# Patient Record
Sex: FEMALE | Race: WHITE
[De-identification: names, ages, dates, MRNs, and addresses within clinical notes are randomized per-mention and may not be internally consistent; named-entity substitution may affect disease eponyms.]

---

## 2018-04-06 ENCOUNTER — HOSPITAL ENCOUNTER (OUTPATIENT)
Dept: HOSPITAL 92 - SCSCT | Age: 83
Discharge: HOME | End: 2018-04-06
Payer: MEDICARE

## 2018-04-06 DIAGNOSIS — H83.3X3: Primary | ICD-10-CM

## 2018-04-06 DIAGNOSIS — I67.82: ICD-10-CM

## 2018-04-06 PROCEDURE — 70450 CT HEAD/BRAIN W/O DYE: CPT

## 2018-04-06 NOTE — CT
HEAD CT NONCONTRAST:

 

INDICATIONS:

Auditory hallucination.

 

COMPARISON:

11/19/2017

 

FINDINGS:

There is mild to moderate chronic microvascular ischemic disease redemonstrated.  The ventricular sys
tem is stable.  Mild age-related parenchymal volume loss is seen.  There is no acute intracranial mas
s effect or midline shift.

 

IMPRESSION:

1.  No intracranial hemorrhage or mass effect.

 

2.  Mild to moderate chronic microvascular ischemic disease.

 

POS: [place]

## 2018-07-10 ENCOUNTER — HOSPITAL ENCOUNTER (INPATIENT)
Dept: HOSPITAL 92 - ERS | Age: 83
LOS: 3 days | Discharge: HOME | DRG: 872 | End: 2018-07-13
Attending: FAMILY MEDICINE | Admitting: FAMILY MEDICINE
Payer: MEDICARE

## 2018-07-10 VITALS — BODY MASS INDEX: 30.1 KG/M2

## 2018-07-10 DIAGNOSIS — Z82.61: ICD-10-CM

## 2018-07-10 DIAGNOSIS — N12: ICD-10-CM

## 2018-07-10 DIAGNOSIS — Z82.49: ICD-10-CM

## 2018-07-10 DIAGNOSIS — K59.00: ICD-10-CM

## 2018-07-10 DIAGNOSIS — A41.51: Primary | ICD-10-CM

## 2018-07-10 DIAGNOSIS — E78.5: ICD-10-CM

## 2018-07-10 DIAGNOSIS — Z85.3: ICD-10-CM

## 2018-07-10 DIAGNOSIS — N95.2: ICD-10-CM

## 2018-07-10 DIAGNOSIS — Z66: ICD-10-CM

## 2018-07-10 DIAGNOSIS — Z88.0: ICD-10-CM

## 2018-07-10 DIAGNOSIS — Z80.3: ICD-10-CM

## 2018-07-10 DIAGNOSIS — F41.8: ICD-10-CM

## 2018-07-10 DIAGNOSIS — F32.9: ICD-10-CM

## 2018-07-10 DIAGNOSIS — I10: ICD-10-CM

## 2018-07-10 DIAGNOSIS — K57.00: ICD-10-CM

## 2018-07-10 LAB
ALBUMIN SERPL BCG-MCNC: 4.4 G/DL (ref 3.4–4.8)
ALP SERPL-CCNC: 63 U/L (ref 40–150)
ALT SERPL W P-5'-P-CCNC: 16 U/L (ref 8–55)
ANION GAP SERPL CALC-SCNC: 15 MMOL/L (ref 10–20)
AST SERPL-CCNC: 20 U/L (ref 5–34)
BASOPHILS # BLD AUTO: 0 THOU/UL (ref 0–0.2)
BASOPHILS NFR BLD AUTO: 0.1 % (ref 0–1)
BILIRUB SERPL-MCNC: 1.6 MG/DL (ref 0.2–1.2)
BUN SERPL-MCNC: 21 MG/DL (ref 9.8–20.1)
CALCIUM SERPL-MCNC: 9.7 MG/DL (ref 7.8–10.44)
CHLORIDE SERPL-SCNC: 104 MMOL/L (ref 98–107)
CO2 SERPL-SCNC: 24 MMOL/L (ref 23–31)
CREAT CL PREDICTED SERPL C-G-VRATE: 0 ML/MIN (ref 70–130)
CRYSTAL-AUWI FLAG: 0.2 (ref 0–15)
EOSINOPHIL # BLD AUTO: 0.3 THOU/UL (ref 0–0.7)
EOSINOPHIL NFR BLD AUTO: 1.6 % (ref 0–10)
GLOBULIN SER CALC-MCNC: 3.2 G/DL (ref 2.4–3.5)
GLUCOSE SERPL-MCNC: 107 MG/DL (ref 83–110)
HEV IGM SER QL: 0.4 (ref 0–7.99)
HGB BLD-MCNC: 13.3 G/DL (ref 12–16)
HYALINE CASTS #/AREA URNS LPF: (no result) LPF
LYMPHOCYTES # BLD: 1.1 THOU/UL (ref 1.2–3.4)
LYMPHOCYTES NFR BLD AUTO: 7.4 % (ref 21–51)
MCH RBC QN AUTO: 30.7 PG (ref 27–31)
MCV RBC AUTO: 87.7 FL (ref 78–98)
MONOCYTES # BLD AUTO: 1 THOU/UL (ref 0.11–0.59)
MONOCYTES NFR BLD AUTO: 6.7 % (ref 0–10)
NEUTROPHILS # BLD AUTO: 13 THOU/UL (ref 1.4–6.5)
NEUTROPHILS NFR BLD AUTO: 84.2 % (ref 42–75)
PATHC CAST-AUWI FLAG: 0.58 (ref 0–2.49)
PLATELET # BLD AUTO: 237 THOU/UL (ref 130–400)
POTASSIUM SERPL-SCNC: 3.7 MMOL/L (ref 3.5–5.1)
RBC # BLD AUTO: 4.33 MILL/UL (ref 4.2–5.4)
RBC UR QL AUTO: (no result) HPF (ref 0–3)
SODIUM SERPL-SCNC: 139 MMOL/L (ref 136–145)
SP GR UR STRIP: 1.01 (ref 1–1.04)
SPERM-AUWI FLAG: 0 (ref 0–9.9)
WBC # BLD AUTO: 15.4 THOU/UL (ref 4.8–10.8)
YEAST-AUWI FLAG: 0 (ref 0–25)

## 2018-07-10 PROCEDURE — 96375 TX/PRO/DX INJ NEW DRUG ADDON: CPT

## 2018-07-10 PROCEDURE — 90471 IMMUNIZATION ADMIN: CPT

## 2018-07-10 PROCEDURE — 81003 URINALYSIS AUTO W/O SCOPE: CPT

## 2018-07-10 PROCEDURE — 80053 COMPREHEN METABOLIC PANEL: CPT

## 2018-07-10 PROCEDURE — 71045 X-RAY EXAM CHEST 1 VIEW: CPT

## 2018-07-10 PROCEDURE — 83605 ASSAY OF LACTIC ACID: CPT

## 2018-07-10 PROCEDURE — 81015 MICROSCOPIC EXAM OF URINE: CPT

## 2018-07-10 PROCEDURE — 36416 COLLJ CAPILLARY BLOOD SPEC: CPT

## 2018-07-10 PROCEDURE — 90670 PCV13 VACCINE IM: CPT

## 2018-07-10 PROCEDURE — 87086 URINE CULTURE/COLONY COUNT: CPT

## 2018-07-10 PROCEDURE — 96361 HYDRATE IV INFUSION ADD-ON: CPT

## 2018-07-10 PROCEDURE — A4216 STERILE WATER/SALINE, 10 ML: HCPCS

## 2018-07-10 PROCEDURE — 36415 COLL VENOUS BLD VENIPUNCTURE: CPT

## 2018-07-10 PROCEDURE — 96365 THER/PROPH/DIAG IV INF INIT: CPT

## 2018-07-10 PROCEDURE — G0009 ADMIN PNEUMOCOCCAL VACCINE: HCPCS

## 2018-07-10 PROCEDURE — 87040 BLOOD CULTURE FOR BACTERIA: CPT

## 2018-07-10 PROCEDURE — 85025 COMPLETE CBC W/AUTO DIFF WBC: CPT

## 2018-07-10 NOTE — RAD
AP VIEW OF THE CHEST

7/10/18

 

INDICATION:

Fever.

 

COMPARISON:  

Prior exam dated 12/30/17.

 

IMPRESSION:  

There is stable cardiomegaly. No air space consolidation, pleural effusion or pneumothorax is evident
. Surgical clips in the left axillary region are stable. Osseous structures are similar.

 

POS: [facility]

## 2018-07-11 LAB
ALBUMIN SERPL BCG-MCNC: 3.9 G/DL (ref 3.4–4.8)
ALP SERPL-CCNC: 55 U/L (ref 40–150)
ALT SERPL W P-5'-P-CCNC: 13 U/L (ref 8–55)
ANION GAP SERPL CALC-SCNC: 13 MMOL/L (ref 10–20)
AST SERPL-CCNC: 18 U/L (ref 5–34)
BASOPHILS # BLD AUTO: 0 THOU/UL (ref 0–0.2)
BASOPHILS NFR BLD AUTO: 0.1 % (ref 0–1)
BILIRUB SERPL-MCNC: 1 MG/DL (ref 0.2–1.2)
BUN SERPL-MCNC: 19 MG/DL (ref 9.8–20.1)
CALCIUM SERPL-MCNC: 8.9 MG/DL (ref 7.8–10.44)
CHLORIDE SERPL-SCNC: 112 MMOL/L (ref 98–107)
CO2 SERPL-SCNC: 21 MMOL/L (ref 23–31)
CREAT CL PREDICTED SERPL C-G-VRATE: 65 ML/MIN (ref 70–130)
EOSINOPHIL # BLD AUTO: 0.5 THOU/UL (ref 0–0.7)
EOSINOPHIL NFR BLD AUTO: 3.4 % (ref 0–10)
GLOBULIN SER CALC-MCNC: 2.7 G/DL (ref 2.4–3.5)
GLUCOSE SERPL-MCNC: 108 MG/DL (ref 83–110)
HGB BLD-MCNC: 11.9 G/DL (ref 12–16)
LYMPHOCYTES # BLD: 1.9 THOU/UL (ref 1.2–3.4)
LYMPHOCYTES NFR BLD AUTO: 13.5 % (ref 21–51)
MCH RBC QN AUTO: 30.6 PG (ref 27–31)
MCV RBC AUTO: 88.8 FL (ref 78–98)
MONOCYTES # BLD AUTO: 1.1 THOU/UL (ref 0.11–0.59)
MONOCYTES NFR BLD AUTO: 7.9 % (ref 0–10)
NEUTROPHILS # BLD AUTO: 10.4 THOU/UL (ref 1.4–6.5)
NEUTROPHILS NFR BLD AUTO: 75.1 % (ref 42–75)
PLATELET # BLD AUTO: 205 THOU/UL (ref 130–400)
POTASSIUM SERPL-SCNC: 4.3 MMOL/L (ref 3.5–5.1)
RBC # BLD AUTO: 3.9 MILL/UL (ref 4.2–5.4)
SODIUM SERPL-SCNC: 142 MMOL/L (ref 136–145)
WBC # BLD AUTO: 13.9 THOU/UL (ref 4.8–10.8)

## 2018-07-11 RX ADMIN — Medication SCH ML: at 20:21

## 2018-07-11 RX ADMIN — Medication SCH ML: at 08:53

## 2018-07-11 NOTE — CON
DATE OF CONSULTATION:  07/11/2018

 

PRIMARY CARE PHYSICIAN:  Dr. Adam Diaz.

 

REASON FOR CONSULT:  History of recurrent urinary tract infection.

 

HISTORY OF PRESENT ILLNESS:  Ms. Lange is a pleasant 88-year-old female well 
known to me for history of recurrent UTI.  Patient with history of PENICILLIN 
allergy, followed by her allergist, Dr. Melo.  She has tolerated previously 
subsequent Keflex without significant issues.  She has minimal urgency and urge 
incontinence history, uses 1 pad per day, second one at night.  She has no 
significant postvoid residual retention of concern.  She is known to have 
severe atrophic vaginitis, grade I rectocele.  She has a propensity for urine 
urinalysis to be contaminated due to her body habitus and severe atrophy.  She 
was last seen by me in June 2018 with no symptoms of UTI.  They desired to come 
off the Keflex low dose and informed to call me if breakthrough UTI.  As I was 
out of the office last week, patient did call our office for symptoms of UTI.  
She did not obtain labs as advised that she had lack of transportation as her 
daughter was out of town.  As she subsequently had a urinalysis culture done 
and was found to have a positive E. coli urinary tract infection and we did 
send out antibiotics.  However, she presented last night due to history of 
fever of 101 per daughter.  She has been provided multiple antibiotics from the 
emergency room and a repeat urinalysis is grossly unremarkable thus far.  Blood 
culture negative thus far.  She states that she feels significantly better with 
IV antibiotics provided.

 

PAST MEDICAL HISTORY:  Hypertension, hyperlipidemia, arthritis, recurrent UTI, 
DJD of the lumbar spine, history of breast cancer, GERD, chronic back pain, 
pelvic prolapse, history of MI.

 

PAST SURGICAL HISTORY:  Total abdominal hysterectomy, breast biopsy, lumpectomy
, cystoscopy, bladder wash in 12/2014 grossly unremarkable.

 

FAMILY HISTORY:  History of CVA.

 

ALLERGIES:  PENICILLIN unconfirmed.  BACTRIM causes hives.

 

REVIEW OF SYSTEMS:  Ten-point review of systems, she has good family assist.

 

CURRENT MEDICATIONS:  Include Tylenol, Rocephin, Lovenox, Toprol, Zofran, 
Protonix, Crestor, Zoloft.

 

PHYSICAL EXAMINATION:

VITAL SIGNS:  Stable.  She is afebrile at 97.9, pulse 79, respirations 16, O2 
sat 92, blood pressure 174/77.  I's and O's bedside commode.

HEENT:  Grossly unremarkable.

HEART:  Regular rate.

LUNGS:  Clear.

ABDOMEN:  Soft, nontender, nondistended.  No CVA tenderness or suprapubic 
tenderness is appreciated.

GENITOURINARY:  Demonstrates severe vaginal atrophy, no gross prolapse 
appreciated on today's exam.

EXTREMITIES:  No cyanosis, clubbing or edema.

 

PERTINENT LABORATORIES:  She is admitted with white count of 15, currently 13.9
, hemoglobin 11.9, platelets 205.  Lactic acid is normal on admission.  
Creatinine stable at 0.75.  Normal LFTs.  Repeat urinalysis from the ER 
demonstrates 0-3 epithelial, no bacteria seen.  Trace leukocytes, large 
leukocyte, 7-10 rbc's, greater than 50 wbc's.  Repeat culture is negative thus 
far.  Blood culture negative.

 

PERTINENT LABS AND IMAGING:  CT in 10/2014 demonstrates diverticulosis, 
bilateral renal cysts, largest in the right kidney 5.7 cm.  There was a focus 
of air in the bladder, thickened bladder wall consistent with cystitis.

 

IMPRESSION AND PLAN:  Ms. Lange is an 88-year-old female well known to me with 
history of breast cancer, hypertension with severe vaginal atrophy with 
recurrent urinary tract infection.  She has been worked up for anatomical 
pathology not of concern.  She has been thoroughly informed regarding her risk 
factors due to severe vaginal atrophy.  She was previously on low dose Keflex.  
I will reconsider initiating a low dose again as they desire to be off 
antibiotics.  Currently, she is responding to antibiotics appropriately.  
Recommend, the patient can be discharged home if she continues to do well 
clinically.  As her repeat urine culture is negative due to multiple 
antibiotics provided from the ER, I would recommend discharging the patient 
with outpatient Omnicef.  As she previously was then cephalosporins without 
significant issues, I do believe it is safe enough for her to be discharged 
with Omnicef 300 mg one p.o. b.i.d. for 7-10 days.  She may follow up with me 
as previous.  She may be discharged, when medically stable per primary service.

 

KATHRYN

## 2018-07-11 NOTE — PDOC.FPRHP
- History of Present Illness


Chief Complaint: Fever/chills


History of Present Illness: 





87 yo F with PMH of HTN and freq UTI presents with frequent urination, 102.6 F, 

WC of 15.4, R 22. Three days ago she was started on bactrim by Dr. Bowen (

urologist). She complains of loss of vira, sore throat starting today. Denies 

dysuria, nausea/vomiting, chest pain, SOB, cough, or congestion. 





- Allergies/Adverse Reactions


 Allergies











Allergy/AdvReac Type Severity Reaction Status Date / Time


 


Penicillins Allergy   Verified 07/10/18 23:33


 


Sulfa (Sulfonamide Allergy   Verified 03/28/15 19:36





Antibiotics)     














- History


PMHx: HTN, Anxiety and depression, breast cancer


 


PSHx: Hysterectomy, partial mastectomy. 





FHx: Mother with heart disease (unspecified), sister with valve replacement, 

denies fam hx of DM, denies fam hx cancer. Brother that  with parkinsons.


 


Social: Denies tobacco use, alcohol or drug use. Retired. . 2 children. 

Daughter is POA.


 








- Review of Systems


General: reports: fever/chills, weight/appetite/sleep changes (5 lb wt gain, 

thought to be due to diet)


ENT: reports: other (hearing loss, R>L).  denies: nasal congestion


Respiratory: denies: cough, congestion, shortness of breath, exercise 

intolerance


Cardiovascular: denies: chest pain, palpitation


Gastrointestinal: reports: diarrhea, constipation (diarrhea and constipation on 

and off).  denies: nausea, vomiting, abdominal pain


Genitourinary: reports: incontinence, polyuria.  denies: dysuria, discharge


Skin: denies: rashes


Musculoskeletal: denies: pain, tenderness, stiffness, arthritis/arthralgias


Neurological: denies: numbness, seizure


Psychological: reports: anxiety, depression (hx of anxiety and depression, well 

controlled on zoloft)





- Vital signs


BP: [127/65]  HR: [70] RR: [18] Tmax: [102.6] Pox: [95]% on [RA]  Wt: [79.6]   








- Physical Exam


Constitutional: NAD, awake, alert and oriented


HEENT: normocephalic and atraumatic, PERRLA, conjunctiva clear, no scleral 

icterus


Neck: supple, other (LAD in cervical lymph nodes)


Heart: RRR, normal S1/S2, no murmurs/rubs/gallops, pulses present, no edema


Lungs: CTAB, good air movement, no rales/rhonchi, no wheezing, no retractions


Abdomen: soft, non-tender, bowel sounds present


Musculoskeletal: normal structure, normal tone


Skin: no rash/lesions


Psychiatric: normal mood and affect, other (somewhat poor historian)





FMR H&P: Results





- Labs


Result Diagrams: 


 18 04:58





 18 04:58


Lab results: 


 











WBC  15.4 thou/uL (4.8-10.8)  H  07/10/18  20:25    


 


Hgb  13.3 g/dL (12.0-16.0)   07/10/18  20:25    


 


Hct  38.0 % (36.0-47.0)   07/10/18  20:25    


 


MCV  87.7 fL (78.0-98.0)   07/10/18  20:25    


 


Plt Count  237 thou/uL (130-400)   07/10/18  20:25    


 


Neutrophils %  84.2 % (42.0-75.0)  H  07/10/18  20:25    


 


Sodium  139 mmol/L (136-145)   07/10/18  20:41    


 


Potassium  3.7 mmol/L (3.5-5.1)   07/10/18  20:41    


 


Chloride  104 mmol/L ()   07/10/18  20:41    


 


Carbon Dioxide  24 mmol/L (23-31)   07/10/18  20:41    


 


BUN  21 mg/dL (9.8-20.1)  H  07/10/18  20:41    


 


Creatinine  0.75 mg/dL (0.6-1.1)   07/10/18  20:41    


 


Glucose  107 mg/dL ()   07/10/18  20:41    


 


Lactic Acid  1.2 mmol/L (0.5-2.2)   07/10/18  20:41    


 


Calcium  9.7 mg/dL (7.8-10.44)   07/10/18  20:41    


 


Total Bilirubin  1.6 mg/dL (0.2-1.2)  H  07/10/18  20:41    


 


AST  20 U/L (5-34)   07/10/18  20:41    


 


ALT  16 U/L (8-55)   07/10/18  20:41    


 


Alkaline Phosphatase  63 U/L ()   07/10/18  20:41    


 


Serum Total Protein  7.6 g/dL (6.0-8.3)   07/10/18  20:41    


 


Albumin  4.4 g/dL (3.4-4.8)   07/10/18  20:41    


 


Urine Ketones  Negative mg/dL (Negative)   07/10/18  21:30    


 


Urine Blood  Trace  (Negative)  H  07/10/18  21:30    


 


Urine Nitrite  Negative  (Negative)   07/10/18  21:30    


 


Ur Leukocyte Esterase  Large  (Negative)  H  07/10/18  21:30    


 


Urine RBC  7-10 HPF (0-3)  H  07/10/18  21:30    


 


Urine WBC  Greater Than 50-TNTC HPF (0-3)  H  07/10/18  21:30    


 


Ur Squamous Epith Cells  0-3 HPF (0-3)   07/10/18  21:30    


 


Urine Bacteria  None Seen HPF (None Seen)   07/10/18  21:30    














- Radiology Interpretation


  ** Chest x-ray


Status: report reviewed by me (Stable cardiomegaly.)





FMR H&P: A/P





- Plan





Sepsis 2/2 to UTI


-Fever, WC 15.4, R 22 


-LA 1.2


-Rocephin started


-Fluid resuscitation, NS @125 ml/hr





Recurrent (likely E coli) UTI, Failed outpt treatment


-Failed outpt therapy on bactrim


-Rocephin and gentamicin started in ED, gentamicin discontinued


-Pending urine culture





HTN


-per pt, on metoprolol. Waiting on dosage to start.





HLD





MES 





Constipation


-Miralax PRN for constipation





Leukocytosis


-WC 15.4, due to UTI and sepsis. will trend





Anxiety/Depression


-well controlled on Zoloft





Code status: DNR


DVT Ppx: lovenox








FMR H&P: Upper Level





- Pertinent history





88 yr old female with HX of recurrent UTIs, HTN, HLD here for fever and chills 

that started the morning on 7/10/18. She states fever or hematuria is usually 

her only indication that she is having a UTI. She was feeling sorta "bad" last 

week with urinary frequency and thought she may be having a UTI. She left urine 

sample on 18 which is growing E. coli. She picked up an antibiotic (unsure 

type) yesterday and had 2 doses before Dr. Umanzor told her to go to ER due 

to worsened fever.  This is per patient history. She reports having a workup 

with Dr. Bob in the outpatient setting for her recurrent UTIs. 


She denies back pain, dysuria, hematuria, chest pain, SOB, cough, skin changes. 





- Pertinent findings





Gen: NAD, interacting appropriately, alert and oriented to person, place, and 

time, situation 


Throat: MMM, no erythema of post pharynx 


Cardiac: RRR, no M/R/G


Lungs: CTAB, no wheezes, rhales, rhonchi 


Neuro: CN 2-12 intact,no neural deficits noted, strength 5/5 in BUE/BLE


MsK: no CVA tenderness 








- Plan


Date/Time: 18 0107








I, [Gianna Pederson], have evaluated this patient and agree with findings/plan as 

outlined by intern resident. Pertinent changes/additions are listed here.


 


1. Sepsis 2/2 recurrent UTI, failed outpatient treatment 


-patient admitted to medical and clinically doing okay, would have to be 

transferred to [facility] as no tele beds available. Will cont on medical since she 

has not been tachycardic. 


-cont on rocephin


-patient improved clinically 


-cont IV fluids 


-daily CBC








2. HTN


-cont home meds once obtained





3. HLD


-cont home meds 











Attending Addendum





- Attending Addendum


Date/Time: 18 1238





I personally evaluated the patient and discussed the management with Dr. ELICEO Taveras and team.


I agree with and repeated the History, Examination, Assessment and Plan 

documented above with any addition or exceptions noted below.





Sepsis 2/2 pyelonephritis.  Has responded well to EGDT.  Continue to monitor.

## 2018-07-11 NOTE — PDOC.FPRHP
- Allergies/Adverse Reactions


 Allergies











Allergy/AdvReac Type Severity Reaction Status Date / Time


 


Penicillins Allergy   Verified 07/10/18 23:33


 


Sulfa (Sulfonamide Allergy   Verified 03/28/15 19:36





Antibiotics)     














- History


PMHx:


 


PSHx: 





FHx:


 


Social:


 








- Vital signs


BP: []  HR: [] RR: [] Tmax: [] Pox: []% on []  Wt: []   








FMR H&P: Results





- Labs


Result Diagrams: 


 07/10/18 20:25





 07/10/18 20:41


Lab results: 


 











WBC  15.4 thou/uL (4.8-10.8)  H  07/10/18  20:25    


 


Hgb  13.3 g/dL (12.0-16.0)   07/10/18  20:25    


 


Hct  38.0 % (36.0-47.0)   07/10/18  20:25    


 


MCV  87.7 fL (78.0-98.0)   07/10/18  20:25    


 


Plt Count  237 thou/uL (130-400)   07/10/18  20:25    


 


Neutrophils %  84.2 % (42.0-75.0)  H  07/10/18  20:25    


 


Sodium  139 mmol/L (136-145)   07/10/18  20:41    


 


Potassium  3.7 mmol/L (3.5-5.1)   07/10/18  20:41    


 


Chloride  104 mmol/L ()   07/10/18  20:41    


 


Carbon Dioxide  24 mmol/L (23-31)   07/10/18  20:41    


 


BUN  21 mg/dL (9.8-20.1)  H  07/10/18  20:41    


 


Creatinine  0.75 mg/dL (0.6-1.1)   07/10/18  20:41    


 


Glucose  107 mg/dL ()   07/10/18  20:41    


 


Lactic Acid  1.2 mmol/L (0.5-2.2)   07/10/18  20:41    


 


Calcium  9.7 mg/dL (7.8-10.44)   07/10/18  20:41    


 


Total Bilirubin  1.6 mg/dL (0.2-1.2)  H  07/10/18  20:41    


 


AST  20 U/L (5-34)   07/10/18  20:41    


 


ALT  16 U/L (8-55)   07/10/18  20:41    


 


Alkaline Phosphatase  63 U/L ()   07/10/18  20:41    


 


Serum Total Protein  7.6 g/dL (6.0-8.3)   07/10/18  20:41    


 


Albumin  4.4 g/dL (3.4-4.8)   07/10/18  20:41    


 


Urine Ketones  Negative mg/dL (Negative)   07/10/18  21:30    


 


Urine Blood  Trace  (Negative)  H  07/10/18  21:30    


 


Urine Nitrite  Negative  (Negative)   07/10/18  21:30    


 


Ur Leukocyte Esterase  Large  (Negative)  H  07/10/18  21:30    


 


Urine RBC  7-10 HPF (0-3)  H  07/10/18  21:30    


 


Urine WBC  Greater Than 50-TNTC HPF (0-3)  H  07/10/18  21:30    


 


Ur Squamous Epith Cells  0-3 HPF (0-3)   07/10/18  21:30    


 


Urine Bacteria  None Seen HPF (None Seen)   07/10/18  21:30    














FMR H&P: Upper Level





- Plan


Date/Time: 07/11/18 0021








I, [], have evaluated this patient and agree with findings/plan as outlined by 

intern resident. Pertinent changes/additions are listed here.

## 2018-07-11 NOTE — PDOC.EVN
Event Note





- Event Note


Event Note: 





Spoke with patient's daughter, Tracey, by patient request. She states the 

patient wants "everything done." 


We discussed this to include CPR, medications, and intubation for which she 

agreed to it all. She stated if she needs to be on life support long term, her 

mother would not want that and family would decide if it came to that.

## 2018-07-12 LAB
ALBUMIN SERPL BCG-MCNC: 3.5 G/DL (ref 3.4–4.8)
ALP SERPL-CCNC: 49 U/L (ref 40–150)
ALT SERPL W P-5'-P-CCNC: 13 U/L (ref 8–55)
ANION GAP SERPL CALC-SCNC: 15 MMOL/L (ref 10–20)
AST SERPL-CCNC: 16 U/L (ref 5–34)
BASOPHILS # BLD AUTO: 0 THOU/UL (ref 0–0.2)
BASOPHILS NFR BLD AUTO: 0.2 % (ref 0–1)
BILIRUB SERPL-MCNC: 0.6 MG/DL (ref 0.2–1.2)
BUN SERPL-MCNC: 14 MG/DL (ref 9.8–20.1)
CALCIUM SERPL-MCNC: 8.3 MG/DL (ref 7.8–10.44)
CHLORIDE SERPL-SCNC: 114 MMOL/L (ref 98–107)
CO2 SERPL-SCNC: 19 MMOL/L (ref 23–31)
CREAT CL PREDICTED SERPL C-G-VRATE: 74 ML/MIN (ref 70–130)
EOSINOPHIL # BLD AUTO: 0.4 THOU/UL (ref 0–0.7)
EOSINOPHIL NFR BLD AUTO: 5.9 % (ref 0–10)
GLOBULIN SER CALC-MCNC: 2.5 G/DL (ref 2.4–3.5)
GLUCOSE SERPL-MCNC: 104 MG/DL (ref 83–110)
HGB BLD-MCNC: 10.7 G/DL (ref 12–16)
LYMPHOCYTES # BLD: 1.5 THOU/UL (ref 1.2–3.4)
LYMPHOCYTES NFR BLD AUTO: 21.3 % (ref 21–51)
MCH RBC QN AUTO: 30.7 PG (ref 27–31)
MCV RBC AUTO: 89.2 FL (ref 78–98)
MONOCYTES # BLD AUTO: 1 THOU/UL (ref 0.11–0.59)
MONOCYTES NFR BLD AUTO: 14.5 % (ref 0–10)
NEUTROPHILS # BLD AUTO: 4.1 THOU/UL (ref 1.4–6.5)
NEUTROPHILS NFR BLD AUTO: 58.2 % (ref 42–75)
PLATELET # BLD AUTO: 190 THOU/UL (ref 130–400)
POTASSIUM SERPL-SCNC: 3.6 MMOL/L (ref 3.5–5.1)
RBC # BLD AUTO: 3.5 MILL/UL (ref 4.2–5.4)
SODIUM SERPL-SCNC: 144 MMOL/L (ref 136–145)
WBC # BLD AUTO: 7.1 THOU/UL (ref 4.8–10.8)

## 2018-07-12 RX ADMIN — Medication SCH ML: at 10:41

## 2018-07-12 RX ADMIN — Medication SCH ML: at 21:01

## 2018-07-12 NOTE — PDOC.FM
- Subjective


Subjective: 


Patient reports chills yesterday that were really scary and bothersome to her, 

but denies any fevers. She is having urinary frequency, but thinks its improved 

from before abx. She denies dysuria, suprapubic pain, N/V. 





- Objective


MAR Reviewed: Yes


Vital Signs & Weight: 


 Vital Signs (12 hours)











  Temp Pulse Resp BP Pulse Ox


 


 07/12/18 05:07  97.7 F  73  16  138/74  97


 


 07/12/18 00:32  98 F  73  18  124/62  97








 Weight











Admit Weight                   79.634 kg


 


Weight                         79.634 kg














I&O: 


 











 07/11/18 07/12/18 07/13/18





 06:59 06:59 06:59


 


Intake Total 1105 1720 


 


Output Total  2000 


 


Balance 1105 -280 











Result Diagrams: 


 07/12/18 03:38





 07/12/18 03:39





<Gianna Sanchez - Last Filed: 07/12/18 08:43>





- Objective


Vital Signs & Weight: 


 Vital Signs (12 hours)











  Temp Pulse Resp BP BP Pulse Ox


 


 07/12/18 08:00  98.0 F  68  20   154/74 H  96


 


 07/12/18 05:07  97.7 F  73  16  138/74   97








 Weight











Admit Weight                   79.634 kg


 


Weight                         79.634 kg














I&O: 


 











 07/11/18 07/12/18 07/13/18





 06:59 06:59 06:59


 


Intake Total 1105 1720 


 


Output Total  2000 


 


Balance 1105 -280 











Result Diagrams: 


 07/12/18 03:38





 07/12/18 03:39





<Scar Steele - Last Filed: 07/12/18 12:53>





Phys Exam





- Physical Examination


Constitutional: NAD


HEENT: moist MMs


Respiratory: no wheezing, no rales, no rhonchi, clear to auscultation bilateral


Cardiovascular: RRR, no significant murmur, no rub


Gastrointestinal: soft, non-tender, no distention, positive bowel sounds


Musculoskeletal: no edema, pulses present


Neurological: non-focal, moves all 4 limbs


Psychiatric: normal affect, A&O x 3


Skin: normal turgor, cap refill <2 seconds





<Gianna Sanchez - Last Filed: 07/12/18 08:43>





Dx/Plan


(1) Sepsis


Code(s): A41.9 - SEPSIS, UNSPECIFIED ORGANISM   Status: Acute   





(2) Leukocytosis


Code(s): D72.829 - ELEVATED WHITE BLOOD CELL COUNT, UNSPECIFIED   Status: Acute

   





(3) Cystitis


Code(s): N30.90 - CYSTITIS, UNSPECIFIED WITHOUT HEMATURIA   Status: Acute   





- Plan


Plan: 


Sepsis, resolved


2/2 UTI


Fever, WC 15.4, R 22, sepsis has resolved at this time with improved WBC count, 

afebrile, and VSS.


-Rocephin


-d/c'd fluids today





Recurrent UTI, Failed outpt treatment


Failed outpt therapy on bactrim. 


-Rocephin and gentamicin started in ED, gentamicin discontinued


-UCx NG @ 12 hrs


-Plan to switch to Omnicef 300mg BID for 7-10 days per Urology recs





HTN


-cont home metoprolol





HLD


-Cont home statin





Constipation


-Miralax PRN for constipation





Leukocytosis, resolved


-WC 15.4, due to UTI. Has resolved. 





Anxiety/Depression


-well controlled on Zoloft, will continue. 





<Gianna Sanchez - Last Filed: 07/12/18 08:43>





Attending Addendum





- Attending Addendum


Date/Time: 07/12/18 1252





I personally evaluated the patient and discussed the management with Dr. Sanchez.


I agree with and repeated the History, Examination, Assessment and Plan 

documented above with any addition or exceptions noted below.





Pt doing very well.  Anticipate d/c with appropriate antibiotics and follow up.








<Scar Steele - Last Filed: 07/12/18 12:53>

## 2018-07-12 NOTE — PRG
DATE OF SERVICE:  07/12/2018

 

SUBJECTIVE:  The patient is feeling well.  Denies dysuria, suprapubic pain, CVA 
tenderness.

 

PHYSICAL EXAMINATION:

VITAL SIGNS:  Stable.  She is afebrile, blood pressure is stable.  I's and O's 
1720 in, 2000 out.  She is negative 280 mL.

ABDOMEN:  Soft, nontender, nondistended.

 

LABORATORY DATA:  White count normalized to 7.1, hemoglobin stable, renal 
function stable with creatinine of 0.66.  Repeat urine culture negative.  Blood 
culture negative thus far.

 

Prior urine culture on 07/06/2018 demonstrates E. coli sensitive to 
cephalosporins, resistant to quinolones and Bactrim.

 

IMPRESSION AND PLAN:  Ms. Raul Lange is an 88-year-old female with history 
of recurrent UTI,  workup negative for occult pathology.  Her main risk 
factors are severe atrophic vaginitis, she was previously on low dose Keflex.  
They desired observation off of antibiotic therapy.  For this acute cystitis, 
recommend Omnicef for 7-10 days for acute cystitis.  She agrees to be back on 
Keflex low dose.  Please provide her with 7 days of Omnicef on  discharge, 
subsequently when she is done with her Omnicef, she should be placed on Keflex 
250 mg 1 p.o. daily.  She can follow up with me as planned.  Call if any 
questions or concerns.  From a urologic perspective, she may be discharged with 
Omnicef, Keflex low dose prophylaxis 250 mg 1 p.o. daily.

 

MTDD

## 2018-07-13 VITALS — SYSTOLIC BLOOD PRESSURE: 153 MMHG | DIASTOLIC BLOOD PRESSURE: 69 MMHG

## 2018-07-13 VITALS — TEMPERATURE: 97.9 F

## 2018-07-13 LAB
ALBUMIN SERPL BCG-MCNC: 3.6 G/DL (ref 3.4–4.8)
ALP SERPL-CCNC: 48 U/L (ref 40–150)
ALT SERPL W P-5'-P-CCNC: 13 U/L (ref 8–55)
ANION GAP SERPL CALC-SCNC: 11 MMOL/L (ref 10–20)
AST SERPL-CCNC: 14 U/L (ref 5–34)
BASOPHILS # BLD AUTO: 0 THOU/UL (ref 0–0.2)
BASOPHILS NFR BLD AUTO: 0.2 % (ref 0–1)
BILIRUB SERPL-MCNC: 0.7 MG/DL (ref 0.2–1.2)
BUN SERPL-MCNC: 15 MG/DL (ref 9.8–20.1)
CALCIUM SERPL-MCNC: 9.2 MG/DL (ref 7.8–10.44)
CHLORIDE SERPL-SCNC: 110 MMOL/L (ref 98–107)
CO2 SERPL-SCNC: 27 MMOL/L (ref 23–31)
CREAT CL PREDICTED SERPL C-G-VRATE: 76 ML/MIN (ref 70–130)
EOSINOPHIL # BLD AUTO: 0.4 THOU/UL (ref 0–0.7)
EOSINOPHIL NFR BLD AUTO: 5.3 % (ref 0–10)
GLOBULIN SER CALC-MCNC: 2.7 G/DL (ref 2.4–3.5)
GLUCOSE SERPL-MCNC: 110 MG/DL (ref 83–110)
HGB BLD-MCNC: 10.9 G/DL (ref 12–16)
LYMPHOCYTES # BLD: 1.4 THOU/UL (ref 1.2–3.4)
LYMPHOCYTES NFR BLD AUTO: 19.6 % (ref 21–51)
MCH RBC QN AUTO: 30.7 PG (ref 27–31)
MCV RBC AUTO: 88.5 FL (ref 78–98)
MONOCYTES # BLD AUTO: 0.8 THOU/UL (ref 0.11–0.59)
MONOCYTES NFR BLD AUTO: 11.6 % (ref 0–10)
NEUTROPHILS # BLD AUTO: 4.5 THOU/UL (ref 1.4–6.5)
NEUTROPHILS NFR BLD AUTO: 63.3 % (ref 42–75)
PLATELET # BLD AUTO: 207 THOU/UL (ref 130–400)
POTASSIUM SERPL-SCNC: 3.7 MMOL/L (ref 3.5–5.1)
RBC # BLD AUTO: 3.54 MILL/UL (ref 4.2–5.4)
SODIUM SERPL-SCNC: 144 MMOL/L (ref 136–145)
WBC # BLD AUTO: 7.2 THOU/UL (ref 4.8–10.8)

## 2018-07-13 RX ADMIN — Medication SCH ML: at 09:22

## 2018-07-13 NOTE — PDOC.FM
- Subjective


Subjective: 


Patient doing well, denies any fevers, chills, abdominal pain, N/V. She reports 

some urinary urgency and frequency, but denies dysuria. 





- Objective


MAR Reviewed: Yes


Vital Signs & Weight: 


 Vital Signs (12 hours)











  Temp Pulse Resp BP Pulse Ox


 


 07/13/18 04:00  97.9 F  59 L  18  178/79 H  95


 


 07/13/18 02:05      96


 


 07/13/18 01:18  98.9 F  71  18  176/72 H  95








 Weight











Admit Weight                   79.634 kg


 


Weight                         79.634 kg














I&O: 


 











 07/12/18 07/13/18 07/14/18





 06:59 06:59 06:59


 


Intake Total 1720 800 


 


Output Total 2000  


 


Balance -280 800 











Result Diagrams: 


 07/13/18 04:49





 07/13/18 04:49





<Gianna Sanchez - Last Filed: 07/13/18 08:44>





- Objective


Vital Signs & Weight: 


 Vital Signs (12 hours)











  Temp Pulse Resp BP BP Pulse Ox


 


 07/13/18 13:30     153/69 H  


 


 07/13/18 12:55   76    


 


 07/13/18 08:56  97.9 F  76  18   184/79 H  97


 


 07/13/18 04:00  97.9 F  59 L  18   178/79 H  95








 Weight











Admit Weight                   79.634 kg


 


Weight                         79.634 kg














I&O: 


 











 07/12/18 07/13/18 07/14/18





 06:59 06:59 06:59


 


Intake Total 1720 800 


 


Output Total 2000  


 


Balance -280 800 











Result Diagrams: 


 07/13/18 04:49





 07/13/18 04:49





<Juma Caceres - Last Filed: 07/13/18 14:27>





Phys Exam





- Physical Examination


Constitutional: NAD


HEENT: moist MMs, sclera anicteric


Respiratory: no wheezing, no rales, no rhonchi, clear to auscultation bilateral


Cardiovascular: RRR, no rub


3/6 systolic murmur


Gastrointestinal: soft, non-tender, no distention, positive bowel sounds


Musculoskeletal: no edema, pulses present


Neurological: non-focal, moves all 4 limbs


Psychiatric: normal affect, A&O x 3


Skin: normal turgor, cap refill <2 seconds





<Gianna Sanchez - Last Filed: 07/13/18 08:44>





Dx/Plan


(1) Sepsis


Code(s): A41.9 - SEPSIS, UNSPECIFIED ORGANISM   Status: Acute   


  QualifierTitle:    Sepsis type: Escherichia coli   Qualified Code(s): A41.51 

- Sepsis due to Escherichia coli [E. coli]   





(2) Leukocytosis


Code(s): D72.829 - ELEVATED WHITE BLOOD CELL COUNT, UNSPECIFIED   Status: Acute

   


  QualifierTitle:    Leukocytosis type: unspecified   Qualified Code(s): 

D72.829 - Elevated white blood cell count, unspecified   





(3) Pyelonephritis


Code(s): N12 - TUBULO-INTERSTITIAL NEPHRITIS, NOT SPCF AS ACUTE OR CHRONIC   

Status: Acute   





(4) HTN (hypertension)


Code(s): I10 - ESSENTIAL (PRIMARY) HYPERTENSION   Status: Acute   


  QualifierTitle:    Hypertension type: essential hypertension   Qualified Code(

s): I10 - Essential (primary) hypertension   





(5) MDD (major depressive disorder)


Code(s): F32.9 - MAJOR DEPRESSIVE DISORDER, SINGLE EPISODE, UNSPECIFIED   Status

: Acute   





(6) HLD (hyperlipidemia)


Code(s): E78.5 - HYPERLIPIDEMIA, UNSPECIFIED   Status: Acute   


  QualifierTitle:    Hyperlipidemia type: unspecified   Qualified Code(s): 

E78.5 - Hyperlipidemia, unspecified   





- Plan


Plan: 


Sepsis, resolved


2/2 pyelonephritis


Fever, WC 15.4, R 22, sepsis has resolved at this time with improved WBC count, 

afebrile, and VSS.


Rocephin initially, but has been switched to cefdinir





Pyelonephritis


Failed outpt therapy on bactrim. Has h/o recurrent UTI's. 


UCx from ER visit a few days prior to admission showed E. coli resistant to 

cipro and bactrim


-Rocephin and gentamicin started in ED, was treated with rocephin for 2 days, 

but has been switched to Cefdinir on 7/12 with plans for 10 days of outpatient 

therapy with this


-UCx NG @ 12 hrs


-f/u with urology outpatient





HTN


-cont home metoprolol





HLD


-Cont home statin





Constipation


-Miralax PRN for constipation





Leukocytosis, resolved


-WC 15.4 intially 2/2 to pyelonephritis. Has resolved. 





Anxiety/Depression


-well controlled on Zoloft, will continue. 





Dispo: plan for d/c home today on cefdinir





<Gianna Sanchez - Last Filed: 07/13/18 08:44>





Attending Addendum





- Attending Addendum


Date/Time: 07/13/18 5813





I personally evaluated the patient and discussed the management with Dr. Sanchez


I agree with the History, Examination, Assessment and Plan documented above 

with any addition or exceptions noted below.Patient is stable for dismissal.








<Juma Caceres - Last Filed: 07/13/18 14:27>

## 2018-07-15 NOTE — DIS-2
DATE OF ADMISSION:  07/11/2018

 

DATE OF DISCHARGE:  07/13/2018

 

ADMITTING ATTENDING:  Cassie Brand M.D.

 

DISCHARGE ATTENDING:  Juma Caceres MD

 

ADMITTING RESIDENT:  Genevieve Taveras MD

 

DISCHARGE RESIDENT:  Gianna Sanchez MD

 

CONSULTATIONS:  Dr. Umanzor with Urology.

 

PROCEDURES:  None.

 

PRIMARY DIAGNOSES:

1.  Sepsis.

2.  Pyelonephritis.

3.  Leukocytosis.

 

SECONDARY DIAGNOSES:

1.  Hypertension.

2.  Hyperlipidemia.

3.  Atrophic vaginitis.

4.  Constipation.

5.  Anxiety and depression.

 

DISCHARGE MEDICATIONS:

1.  Cefdinir 300 mg p.o. q. 12 hours for 9 days.

2.  Cephalexin 250 mg p.o. daily to be started after completion of the cefdinir.

3.  Aspirin 81 mg p.o. daily.

4.  Nexium 40 mg p.o. q.a.m.

5.  Metoprolol succinate 25 mg extended release 20 p.o. b.i.d.

6.  MiraLax 17 grams p.o. daily p.r.n. constipation.

7.  Rosuvastatin 10 mg p.o. daily.

8.  Sertraline 50 mg p.o. daily.

 

DISCONTINUED MEDICATIONS:  None.

 

HISTORY OF PRESENT ILLNESS AND HOSPITAL COURSE:  This is an 88-year-old female with past medical hist
ory of atrophic vaginitis and recurrent UTIs, presents to the ER with sepsis secondary to urinary tra
ct infection.  The patient was found to have an initial white blood cell count of 15.4.  The patient 
had been seen in the clinic on 07/06/2018 and the patient had been given Bactrim empirically prior to
 the results of that by her PCP.  However, the patient's symptoms had continued to get worse, and so 
she presented to the ER.  The patient was previously on a low dose prophylactic antibiotic for her re
current urinary tract infections; however, has not been on one recently.  The patient was started on 
Rocephin and showed significant clinical improvement.  The patient sees Dr. Umanzor from Urology,
 who discussed starting her back on a prophylactic medication after she recovers from this urinary tr
act infection.  The patient's white blood cell count trended down to 13.9 and then 7.1 after fluids a
nd antibiotics.  The patient's urine culture from this hospitalization grew 25,000-50,000 mixed skin 
and enteric mary.

 

Of note, the patient also notes some elevated blood pressures during her hospitalization.  She is onl
y on metoprolol for blood pressure control at home.  The patient was given a dose of hydralazine to h
elp improve her blood pressure, which improved it to 150/69.  The patient was recommended to discuss 
her blood pressure control with her primary care physician, as the patient reported that her blood pr
essure is not usually this high at home.  I did not want to drop her blood pressure too low and put h
er at risk for falls at this time.

 

DISPOSITION:  Stable.

 

DISCHARGE INSTRUCTIONS:

1.  Location:  Home.

2.  Diet:  Heart healthy.

3.  Activity:  As tolerated.

4.  Follow up with Dr. Diaz in 1 week and with Dr. Umanzor.

## 2018-10-25 ENCOUNTER — HOSPITAL ENCOUNTER (OUTPATIENT)
Dept: HOSPITAL 92 - SCSER | Age: 83
Setting detail: OBSERVATION
LOS: 1 days | Discharge: HOME | End: 2018-10-26
Attending: FAMILY MEDICINE | Admitting: FAMILY MEDICINE
Payer: MEDICARE

## 2018-10-25 VITALS — BODY MASS INDEX: 28.5 KG/M2

## 2018-10-25 DIAGNOSIS — Z88.0: ICD-10-CM

## 2018-10-25 DIAGNOSIS — Z88.2: ICD-10-CM

## 2018-10-25 DIAGNOSIS — E78.5: ICD-10-CM

## 2018-10-25 DIAGNOSIS — N30.00: Primary | ICD-10-CM

## 2018-10-25 DIAGNOSIS — F41.9: ICD-10-CM

## 2018-10-25 DIAGNOSIS — Z79.82: ICD-10-CM

## 2018-10-25 DIAGNOSIS — Z79.899: ICD-10-CM

## 2018-10-25 DIAGNOSIS — I10: ICD-10-CM

## 2018-10-25 DIAGNOSIS — F32.9: ICD-10-CM

## 2018-10-25 DIAGNOSIS — E86.0: ICD-10-CM

## 2018-10-25 LAB
ALBUMIN SERPL BCG-MCNC: 4.6 G/DL (ref 3.4–4.8)
ALP SERPL-CCNC: 57 U/L (ref 40–150)
ALT SERPL W P-5'-P-CCNC: 18 U/L (ref 8–55)
ANION GAP SERPL CALC-SCNC: 19 MMOL/L (ref 10–20)
AST SERPL-CCNC: 26 U/L (ref 5–34)
BACTERIA UR QL AUTO: (no result) HPF
BASOPHILS # BLD AUTO: 0.1 THOU/UL (ref 0–0.2)
BASOPHILS NFR BLD AUTO: 1.2 % (ref 0–1)
BILIRUB SERPL-MCNC: 0.9 MG/DL (ref 0.2–1.2)
BUN SERPL-MCNC: 27 MG/DL (ref 9.8–20.1)
CALCIUM SERPL-MCNC: 10.1 MG/DL (ref 7.8–10.44)
CHLORIDE SERPL-SCNC: 107 MMOL/L (ref 98–107)
CK MB SERPL-MCNC: 2.5 NG/ML (ref 0–6.6)
CO2 SERPL-SCNC: 19 MMOL/L (ref 23–31)
CREAT CL PREDICTED SERPL C-G-VRATE: 0 ML/MIN (ref 70–130)
EOSINOPHIL # BLD AUTO: 0.2 THOU/UL (ref 0–0.7)
EOSINOPHIL NFR BLD AUTO: 2.4 % (ref 0–10)
GLOBULIN SER CALC-MCNC: 3.8 G/DL (ref 2.4–3.5)
GLUCOSE SERPL-MCNC: 112 MG/DL (ref 83–110)
HGB BLD-MCNC: 14 G/DL (ref 12–16)
LIPASE SERPL-CCNC: 6 U/L (ref 8–78)
LYMPHOCYTES # BLD: 2.3 THOU/UL (ref 1.2–3.4)
LYMPHOCYTES NFR BLD AUTO: 25.5 % (ref 21–51)
MCH RBC QN AUTO: 28.1 PG (ref 27–31)
MCV RBC AUTO: 85.5 FL (ref 78–98)
MONOCYTES # BLD AUTO: 0.8 THOU/UL (ref 0.11–0.59)
MONOCYTES NFR BLD AUTO: 8.7 % (ref 0–10)
NEUTROPHILS # BLD AUTO: 5.7 THOU/UL (ref 1.4–6.5)
NEUTROPHILS NFR BLD AUTO: 62.3 % (ref 42–75)
PLATELET # BLD AUTO: 217 THOU/UL (ref 130–400)
POTASSIUM SERPL-SCNC: 4.3 MMOL/L (ref 3.5–5.1)
PROT UR STRIP.AUTO-MCNC: 100 MG/DL
RBC # BLD AUTO: 4.98 MILL/UL (ref 4.2–5.4)
SODIUM SERPL-SCNC: 141 MMOL/L (ref 136–145)
SP GR UR STRIP: 1.01 (ref 1–1.03)
TROPONIN I SERPL DL<=0.01 NG/ML-MCNC: (no result) NG/ML (ref ?–0.03)
WBC # BLD AUTO: 9.1 THOU/UL (ref 4.8–10.8)
WBC UR QL AUTO: (no result) HPF (ref 0–3)

## 2018-10-25 PROCEDURE — 84484 ASSAY OF TROPONIN QUANT: CPT

## 2018-10-25 PROCEDURE — 84443 ASSAY THYROID STIM HORMONE: CPT

## 2018-10-25 PROCEDURE — 87077 CULTURE AEROBIC IDENTIFY: CPT

## 2018-10-25 PROCEDURE — 96361 HYDRATE IV INFUSION ADD-ON: CPT

## 2018-10-25 PROCEDURE — 87086 URINE CULTURE/COLONY COUNT: CPT

## 2018-10-25 PROCEDURE — 93005 ELECTROCARDIOGRAM TRACING: CPT

## 2018-10-25 PROCEDURE — 81003 URINALYSIS AUTO W/O SCOPE: CPT

## 2018-10-25 PROCEDURE — 85025 COMPLETE CBC W/AUTO DIFF WBC: CPT

## 2018-10-25 PROCEDURE — G0378 HOSPITAL OBSERVATION PER HR: HCPCS

## 2018-10-25 PROCEDURE — 82553 CREATINE MB FRACTION: CPT

## 2018-10-25 PROCEDURE — 80048 BASIC METABOLIC PNL TOTAL CA: CPT

## 2018-10-25 PROCEDURE — 81015 MICROSCOPIC EXAM OF URINE: CPT

## 2018-10-25 PROCEDURE — 83880 ASSAY OF NATRIURETIC PEPTIDE: CPT

## 2018-10-25 PROCEDURE — 99285 EMERGENCY DEPT VISIT HI MDM: CPT

## 2018-10-25 PROCEDURE — 87186 SC STD MICRODIL/AGAR DIL: CPT

## 2018-10-25 PROCEDURE — 96365 THER/PROPH/DIAG IV INF INIT: CPT

## 2018-10-25 PROCEDURE — 80053 COMPREHEN METABOLIC PANEL: CPT

## 2018-10-25 PROCEDURE — 36415 COLL VENOUS BLD VENIPUNCTURE: CPT

## 2018-10-25 PROCEDURE — 71045 X-RAY EXAM CHEST 1 VIEW: CPT

## 2018-10-25 PROCEDURE — 82550 ASSAY OF CK (CPK): CPT

## 2018-10-25 PROCEDURE — 83690 ASSAY OF LIPASE: CPT

## 2018-10-25 NOTE — RAD
PORTABLE AP CHEST X-RAY

10/25/18

 

HISTORY: 

Chest pain. 

 

COMPARISON:  

7/10/18.

 

FINDINGS:  

The cardiac silhouette remains mildly enlarged. Pulmonary vasculature is within normal limits. There 
is linear scarring again present at the left lung base. The lungs are otherwise clear. Surgical clips
 again overlie the left axillary region. 

 

IMPRESSION:  

1.      No acute cardiopulmonary process. 

2.      Mild cardiomegaly. 

 

POS: [facility]

## 2018-10-25 NOTE — PDOC.FPRHP
- History of Present Illness


Chief Complaint: weakness


History of Present Illness: 





89 yo F with PMH of recurrent UTIs on ppx cefdinir is transfer from hospitals 

ED for dizziness. This afternoon she started feeling weak so went home and 

check her BP which was 90/60 (low for her) and pulse in 130s. She thought she 

may be getting another UTI so went to the [place] ED. She has been 

hospitalized multiple times for severe UTIs and is on prophylactic cefdinir. At 

the time of sxs she denied fevers, dysuria, abdominal or flank pain.





 


ED Course: 





NS bolus, rocephin x1





- Allergies/Adverse Reactions


 Allergies











Allergy/AdvReac Type Severity Reaction Status Date / Time


 


Penicillins Allergy   Verified 10/25/18 23:44


 


Sulfa (Sulfonamide Allergy   Verified 10/25/18 23:44





Antibiotics)     














- Home Medications


 











 Medication  Instructions  Recorded  Confirmed  Type


 


Aspirin [Aspirin Chewable Tablet] 81 mg PO DAILY 18 History


 


Esomeprazole Magnesium [NexIUM] 40 mg PO QAM-WM 18 History


 


Metoprolol Succinate [Toprol Xl] 25 mg PO BID 18 History


 


Rosuvastatin Calcium 10 mg PO DAILY 18 History


 


Sertraline HCl 50 mg PO DAILY 18 History


 


Cefdinir 300 mg PO Q12HR #18 capsule 18  Rx


 


Polyethylene Glycol 3350 [Miralax] 17 gm PO DAILYPRN PRN  pk 18  Rx


 


Cephalexin [Keflex] 250 mg PO DAILY #30 cap 07/15/18  Rx














- History


PMHx: Recurrent UTIs, prolapsed bladder, HTN, Anxiety and depression, breast 

cancer


 


PSHx: Hysterectomy, partial mastectomy. 





FHx: Mother with heart disease (unspecified), sister with valve replacement, 

denies fam hx of DM, denies fam hx cancer. Brother that  with parkinsons.


 


Social: Denies tobacco use, alcohol or drug use. Retired. . 2 children. 

Daughter is POA.


 








- Review of Systems


General: denies: fever/chills, weight/appetite/sleep changes


Eyes: denies: vision changes


ENT: denies: nasal congestion, rhinorrhea


Respiratory: denies: cough, congestion, shortness of breath


Cardiovascular: denies: chest pain, palpitation


Gastrointestinal: denies: nausea, vomiting, diarrhea


Genitourinary: denies: incontinence, dysuria, discharge


Skin: denies: lesions, jaundice


Musculoskeletal: denies: tenderness, stiffness


Neurological: reports: weakness.  denies: syncope, seizure


Psychological: denies: anxiety, depression





- Vital signs


BP: [183/81]  HR: [63] RR: [14] Tmax: [97.8] Pox: [94]% on [RA]  Wt: [75]   








- Physical Exam


Constitutional: NAD, awake, alert and oriented


HEENT: normocephalic and atraumatic, PERRLA, EOMI


Neck: supple, FROM


Chest: no-tender to palpation, no lesions


Heart: RRR, normal S1/S2


Lungs: CTAB, no respiratory distress


Abdomen: soft, non-tender, no masses/distention


Musculoskeletal: normal structure


Neurological: no focal deficit, CN II-XII intact


Skin: no rash/lesions, capillary refill <2 seconds


Heme/Lymphatic: no unusual bruising or bleeding


Psychiatric: normal mood and affect





FMR H&P: Results





- Labs


Result Diagrams: 


 10/26/18 04:21





 10/26/18 04:21


Lab results: 


 











WBC  9.1 thou/uL (4.8-10.8)   10/25/18  18:25    


 


Hgb  14.0 g/dL (12.0-16.0)   10/25/18  18:25    


 


Hct  42.6 % (36.0-47.0)   10/25/18  18:25    


 


MCV  85.5 fL (78.0-98.0)   10/25/18  18:25    


 


Plt Count  217 thou/uL (130-400)   10/25/18  18:25    


 


Neutrophils %  62.3 % (42.0-75.0)   10/25/18  18:25    


 


Sodium  141 mmol/L (136-145)   10/25/18  18:25    


 


Potassium  4.3 mmol/L (3.5-5.1)   10/25/18  18:25    


 


Chloride  107 mmol/L ()   10/25/18  18:25    


 


Carbon Dioxide  19 mmol/L (23-31)  L  10/25/18  18:25    


 


BUN  27 mg/dL (9.8-20.1)  H  10/25/18  18:25    


 


Creatinine  0.97 mg/dL (0.6-1.1)   10/25/18  18:25    


 


Glucose  112 mg/dL ()  H  10/25/18  18:25    


 


Calcium  10.1 mg/dL (7.8-10.44)   10/25/18  18:25    


 


Total Bilirubin  0.9 mg/dL (0.2-1.2)   10/25/18  18:25    


 


AST  26 U/L (5-34)   10/25/18  18:25    


 


ALT  18 U/L (8-55)   10/25/18  18:25    


 


Alkaline Phosphatase  57 U/L ()   10/25/18  18:25    


 


Creatine Kinase  91 U/L ()   10/25/18  18:25    


 


CK-MB (CK-2)  2.5 ng/mL (0-6.6)   10/25/18  18:25    


 


B-Natriuretic Peptide  95.9 pg/mL (0-100)   10/25/18  18:25    


 


Serum Total Protein  8.4 g/dL (6.0-8.3)  H  10/25/18  18:25    


 


Albumin  4.6 g/dL (3.4-4.8)   10/25/18  18:25    


 


Lipase  6 U/L (8-78)  L  10/25/18  18:25    


 


Urine Ketones  Negative mg/dL (Negative)   10/25/18  19:08    


 


Urine Blood  Large  (Negative)  H  10/25/18  19:08    


 


Urine Nitrite  Positive  (Negative)  H  10/25/18  19:08    


 


Ur Leukocyte Esterase  Large  (Negative)  H  10/25/18  19:08    


 


Urine RBC  11-20 HPF (0-3)  H  10/25/18  19:08    


 


Urine WBC  21-50 HPF (0-3)  H  10/25/18  19:08    


 


Ur Squamous Epith Cells  0-3 HPF (0-3)   10/25/18  19:08    


 


Urine Bacteria  2+ HPF (None Seen)  H  10/25/18  19:08    














FMR H&P: A/P





- Problem List


(1) UTI (urinary tract infection)


Current Visit: Yes   Status: Acute   





(2) HLD (hyperlipidemia)


Current Visit: No   Status: Acute   Code(s): E78.5 - HYPERLIPIDEMIA, 

UNSPECIFIED   


Qualifiers: 


   Hyperlipidemia type: unspecified   Qualified Code(s): E78.5 - Hyperlipidemia

, unspecified   





(3) HTN (hypertension)


Current Visit: No   Status: Acute   Code(s): I10 - ESSENTIAL (PRIMARY) 

HYPERTENSION   


Qualifiers: 


   Hypertension type: essential hypertension   Qualified Code(s): I10 - 

Essential (primary) hypertension   





(4) MDD (major depressive disorder)


Current Visit: No   Status: Acute   Code(s): F32.9 - MAJOR DEPRESSIVE DISORDER, 

SINGLE EPISODE, UNSPECIFIED   





- Plan


Disposition/LOS: 








89 yo F with hx of recurrent UTIs with UTI w/ failed outpt. tx





1. UTI with failed outpatient treatment


-no sepsis criteria met. stable. 


-recurrent UTIs likely 2/2 prolapsed bladder (per pt)


-patient had been on cefdinir outpt from urologist


-possibility there there is change in organism species/resistance/

sensitivities. will obtain new ucx to reassess and tailor abx as needed


-continue rocephin & macrobid, prior cultures show sensitivity to these meds


-continue mIVF


-consider discussed with urologist change in ppx abx





2. HTN


-continue home meds





3. HLD


-continue home meds





4. Depression/anxiety


-continue home meds





discussed with dr. bello





FMR H&P: [place]





- Pertinent history





88F with PMH of recurrent UTI's who was evaluated at [facility] ED for near 

syncope and cloudy urine. She reports not feeling well today. This afternoon 

she "felt her blood pressure get low". This was followed by dizziness but no 

syncopal episode or LOC. She came to the ED at that point for further 

evaluation. She endorses normal PO intake over the last several days and denies 

dysuria and hematuria. 








Seen by Urology, Dr. Gan, for her recurrent UTIs. Work up for 

anatomical pathology has been negative. Last urine culture from July was 

resistant to Cipro and Bactrim. Grew E. coli.








ED: NS 1 L, Ceftriaxone 1 G





PMH: HTN, HLD, GERD, Anx/Depression, Constipation, hx of breast cancer, hx of MI

, DJD of lumbar spine


Allergies: PCN





- Pertinent findings





Vitals:


127/72 mmHg   78 bpm   18 breaths/m   95% on RA   97.8F





Gen: A&Ox3; in no acute distress


HEENT: dry MM


CV: RRR; no murmurs


Pulm: CTA-B


Abd: soft; nonTTP; no guarding; no CVA tenderness


Skin: no rashes or lesions





EKG: first degree AV block


CXR: no acute process





WBC: 9.1


BUN/Cr: 27/0.97


UA: elevated protein; large blood; positive nitrites; large leuk est.; +RBC, WBC

, bacteria








- Plan


Date/Time: 10/25/18 0879





1. UTI: hx of recurrent infections on OP PPX abx. Will continue the Rocephin 

and macrobid. Urine culture pending. Hemodynamically stable, does not meet 

sepsis criteria. S/p 1L NS bolus, will continue maintenance IVF. Will need OP f/

u with her Urologist to discuss altering PPX regimen.


2. HTN: continue home medications


3. HLD: continue home statin


4. Depression/Anxiety: continue home medication








I, Angel Schafer, have evaluated this patient and agree with findings/plan as 

outlined by intern resident. Pertinent changes/additions are listed here.








Attending Addendum





- Attending Addendum


Date/Time: 10/26/18 1026





I personally evaluated the patient and discussed the management with Dr. Jones.


I agree with the History, Examination, Assessment and Plan documented above 

with any addition or exceptions noted below.


88 y.o. WF with h/o Bladder Prolapse and chronic urinary colonization p/w 

hypotension/tachycardia responsive to IVF. The patient was given IV Rocephin 

for a UA that appeared to have an infection. She has been on chronic 

suppressive therapy with keflex. Her prior Urine Cultures have grown E. coli 

resistant to Fluoroquinolones and Bactrim. Will d/c home today on Cefdinir and 

have the pt f/u Monday with Dr. Diaz and this next month with her 

urologist.

## 2018-10-26 VITALS — SYSTOLIC BLOOD PRESSURE: 175 MMHG | DIASTOLIC BLOOD PRESSURE: 73 MMHG | TEMPERATURE: 97.8 F

## 2018-10-26 LAB
ANION GAP SERPL CALC-SCNC: 12 MMOL/L (ref 10–20)
BASOPHILS # BLD AUTO: 0 THOU/UL (ref 0–0.2)
BASOPHILS NFR BLD AUTO: 0.3 % (ref 0–1)
BUN SERPL-MCNC: 25 MG/DL (ref 9.8–20.1)
CALCIUM SERPL-MCNC: 8.6 MG/DL (ref 7.8–10.44)
CHLORIDE SERPL-SCNC: 112 MMOL/L (ref 98–107)
CO2 SERPL-SCNC: 21 MMOL/L (ref 23–31)
CREAT CL PREDICTED SERPL C-G-VRATE: 63 ML/MIN (ref 70–130)
EOSINOPHIL # BLD AUTO: 0.3 THOU/UL (ref 0–0.7)
EOSINOPHIL NFR BLD AUTO: 3.6 % (ref 0–10)
GLUCOSE SERPL-MCNC: 99 MG/DL (ref 83–110)
HGB BLD-MCNC: 11.4 G/DL (ref 12–16)
LYMPHOCYTES # BLD: 2.2 THOU/UL (ref 1.2–3.4)
LYMPHOCYTES NFR BLD AUTO: 30.1 % (ref 21–51)
MCH RBC QN AUTO: 29.5 PG (ref 27–31)
MCV RBC AUTO: 88.1 FL (ref 78–98)
MONOCYTES # BLD AUTO: 0.9 THOU/UL (ref 0.11–0.59)
MONOCYTES NFR BLD AUTO: 12.4 % (ref 0–10)
NEUTROPHILS # BLD AUTO: 3.8 THOU/UL (ref 1.4–6.5)
NEUTROPHILS NFR BLD AUTO: 53.5 % (ref 42–75)
PLATELET # BLD AUTO: 246 THOU/UL (ref 130–400)
POTASSIUM SERPL-SCNC: 3.7 MMOL/L (ref 3.5–5.1)
RBC # BLD AUTO: 3.87 MILL/UL (ref 4.2–5.4)
SODIUM SERPL-SCNC: 141 MMOL/L (ref 136–145)
WBC # BLD AUTO: 7.1 THOU/UL (ref 4.8–10.8)

## 2018-10-26 NOTE — DIS-2
DATE OF ADMISSION: 10/25/2018

 

DATE OF DISCHARGE:  10/26/2018 

 

ADMITTING RESIDENT:  Dr. Eri Jones

 

DISCHARGE RESIDENT:  Dr. Gianna Sanchez

 

ADMITTING ATTENDING:  Aftab Andre M.D.

 

DISCHARGE ATTENDING:  Aftab Andre M.D..

 

CONSULTATIONS:  None.

 

PROCEDURES:  None.

 

PRIMARY DIAGNOSES:

1.  Acute cystitis.

2.  Hypertension.

3.  Tachycardia.

4.  Dehydration.

 

SECONDARY DIAGNOSES:

1.  Recurrent urinary tract infections.

2.  Bladder prolapse.

3.  Hypertension.

4.  Hyperlipidemia.

5.  Depression.

6.  Anxiety.

 

DISCHARGE MEDICATIONS:

1.  Cefdinir 300 mg p.o. q.12h. for 6 days.

2.  Keflex 250 mg p.o. daily to be started after the cefdinir is completed.

3.  Aspirin 81 mg p.o. daily.

4.  Nexium 40 mg p.o. q.a.m. with meals.

5.  MiraLax 17 grams p.o. daily p.r.n. constipation.

6.  Simvastatin 10 mg p.o. daily.

7.  Sertraline 50 mg p.o. daily.

8.  Amlodipine 2.5 mg p.o. at bedtime.

9.  Metoprolol succinate 25 mg p.o. b.i.d.

 

DISCONTINUED MEDICATIONS:  None.

 

HISTORY OF PRESENT ILLNESS AND HOSPITAL COURSE:  This is an 88-year-old female with past medical hist
ory of bladder prolapse and recurrent urinary tract infections who presented to the ER due to hypoten
misa and tachycardia.  The patient was found to have a UA that showed positive nitrites, large leukoc
yte esterase and 2+ bacteria.  The patient has a history of recurrent UTIs that generally grew E. col
i resistant to Bactrim and fluoroquinolones.  The patient has been on chronic suppression therapy wit
h Keflex 250 mg p.o. daily.  The patient sees Dr. Umanzor with Urology and reports that she has a
 followup appointment within about a month with her.  The patient's hypotension and tachycardia respo
nded very well to 1 fluid bolus.  The patient, by the time she was on the floor, felt completely back
 to normal and was having no signs of dysuria, suprapubic pressure or back pain, increased urinary fr
equency.  The patient was never febrile.  The patient never had an elevated white blood cell count.  
The case was discussed with both Dr. Diaz and Dr. Umanzor and it was agreed for the patient t
o be discharged home on cefdinir and to resume her Keflex at the end of the course of cefdinir.  The 
patient will follow up with Dr. Diaz in his clinic early this next week and he will follow up on 
the urine culture to determine if that treatment is adequate.  The patient will continue her routine 
follow up with Dr. Umanzor.  The patient is status post 1 dose of Rocephin as well.

 

DISPOSITION:  Stable.

 

DISCHARGE INSTRUCTIONS:

1.  Location:  Home.

2.  Diet:  Heart healthy.

3.  Activity:  As tolerated.

4.  Followup:  Follow up with Dr. Diaz within 3-4 days and Dr. Umanzor within 1 month.

## 2018-11-26 ENCOUNTER — HOSPITAL ENCOUNTER (EMERGENCY)
Dept: HOSPITAL 92 - SCSER | Age: 83
Discharge: HOME | End: 2018-11-26
Payer: MEDICARE

## 2018-11-26 DIAGNOSIS — E78.5: ICD-10-CM

## 2018-11-26 DIAGNOSIS — F32.9: ICD-10-CM

## 2018-11-26 DIAGNOSIS — M25.571: ICD-10-CM

## 2018-11-26 DIAGNOSIS — Z79.899: ICD-10-CM

## 2018-11-26 DIAGNOSIS — M79.89: Primary | ICD-10-CM

## 2018-11-26 DIAGNOSIS — K21.9: ICD-10-CM

## 2018-11-26 DIAGNOSIS — I10: ICD-10-CM

## 2018-11-26 DIAGNOSIS — F41.9: ICD-10-CM

## 2018-11-26 DIAGNOSIS — Z79.82: ICD-10-CM

## 2018-11-26 NOTE — RAD
THREE VIEW RIGHT FOOT:

 

Indication: Pain, edema. 

 

FINDINGS: 

There is evidence to indicate prior osteotomy of the second digit centered about the PIP joint. Corre
late with history. 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 
soft tissue bunion. No dislocation of lisfranc joint. There is mild soft tissue prominence. 

 

IMPRESSION: 

Chronic findings of the right foot are indicated, without evidence of an acute fracture. 

 

POS: [facility]

## 2018-11-26 NOTE — RAD
RIGHT ANKLE THREE VIEWS:

 

History: Right ankle pain. 

 

FINDINGS/IMPRESSION: 

The ankle mortise is maintained. Soft tissue swelling is present. No fracture, dislocation, or bony d
estruction is identified. 

 

POS: [facility]

## 2018-11-30 ENCOUNTER — HOSPITAL ENCOUNTER (EMERGENCY)
Dept: HOSPITAL 92 - SCSER | Age: 83
Discharge: HOME | End: 2018-11-30
Payer: MEDICARE

## 2018-11-30 DIAGNOSIS — Z79.899: ICD-10-CM

## 2018-11-30 DIAGNOSIS — N30.00: Primary | ICD-10-CM

## 2018-11-30 DIAGNOSIS — I10: ICD-10-CM

## 2018-11-30 LAB
BACTERIA UR QL AUTO: (no result) HPF
PROT UR STRIP.AUTO-MCNC: 100 MG/DL
SP GR UR STRIP: 1.02 (ref 1–1.03)
WBC UR QL AUTO: (no result) HPF (ref 0–3)

## 2018-11-30 PROCEDURE — 87086 URINE CULTURE/COLONY COUNT: CPT

## 2018-11-30 PROCEDURE — 81003 URINALYSIS AUTO W/O SCOPE: CPT

## 2018-11-30 PROCEDURE — 99283 EMERGENCY DEPT VISIT LOW MDM: CPT

## 2018-11-30 PROCEDURE — 81015 MICROSCOPIC EXAM OF URINE: CPT

## 2018-12-09 ENCOUNTER — HOSPITAL ENCOUNTER (INPATIENT)
Dept: HOSPITAL 92 - SCSER | Age: 83
LOS: 2 days | Discharge: HOME | DRG: 872 | End: 2018-12-11
Attending: FAMILY MEDICINE | Admitting: FAMILY MEDICINE
Payer: MEDICARE

## 2018-12-09 VITALS — BODY MASS INDEX: 31.1 KG/M2

## 2018-12-09 DIAGNOSIS — B96.20: ICD-10-CM

## 2018-12-09 DIAGNOSIS — I10: ICD-10-CM

## 2018-12-09 DIAGNOSIS — F41.9: ICD-10-CM

## 2018-12-09 DIAGNOSIS — E78.5: ICD-10-CM

## 2018-12-09 DIAGNOSIS — K21.9: ICD-10-CM

## 2018-12-09 DIAGNOSIS — A41.9: Primary | ICD-10-CM

## 2018-12-09 DIAGNOSIS — E87.6: ICD-10-CM

## 2018-12-09 DIAGNOSIS — B96.5: ICD-10-CM

## 2018-12-09 DIAGNOSIS — Z88.0: ICD-10-CM

## 2018-12-09 DIAGNOSIS — Z88.2: ICD-10-CM

## 2018-12-09 DIAGNOSIS — J98.11: ICD-10-CM

## 2018-12-09 DIAGNOSIS — Z79.82: ICD-10-CM

## 2018-12-09 DIAGNOSIS — N39.0: ICD-10-CM

## 2018-12-09 DIAGNOSIS — Z16.23: ICD-10-CM

## 2018-12-09 DIAGNOSIS — F32.9: ICD-10-CM

## 2018-12-09 LAB
ALBUMIN SERPL BCG-MCNC: 4 G/DL (ref 3.4–4.8)
ALP SERPL-CCNC: 60 U/L (ref 40–150)
ALT SERPL W P-5'-P-CCNC: 15 U/L (ref 8–55)
ANION GAP SERPL CALC-SCNC: 14 MMOL/L (ref 10–20)
AST SERPL-CCNC: 18 U/L (ref 5–34)
BACTERIA UR QL AUTO: (no result) HPF
BILIRUB SERPL-MCNC: 1.5 MG/DL (ref 0.2–1.2)
BUN SERPL-MCNC: 18 MG/DL (ref 9.8–20.1)
CALCIUM SERPL-MCNC: 9.3 MG/DL (ref 7.8–10.44)
CHLORIDE SERPL-SCNC: 102 MMOL/L (ref 98–107)
CO2 SERPL-SCNC: 24 MMOL/L (ref 23–31)
CREAT CL PREDICTED SERPL C-G-VRATE: 0 ML/MIN (ref 70–130)
GLOBULIN SER CALC-MCNC: 3.4 G/DL (ref 2.4–3.5)
GLUCOSE SERPL-MCNC: 135 MG/DL (ref 83–110)
HGB BLD-MCNC: 11.9 G/DL (ref 12–16)
LIPASE SERPL-CCNC: (no result) U/L (ref 8–78)
MCH RBC QN AUTO: 28.8 PG (ref 27–31)
MCV RBC AUTO: 82.3 FL (ref 78–98)
MDIFF COMPLETE?: YES
PLATELET # BLD AUTO: 266 THOU/UL (ref 130–400)
PLATELET BLD QL SMEAR: (no result)
POTASSIUM SERPL-SCNC: 2.8 MMOL/L (ref 3.5–5.1)
PROT UR STRIP.AUTO-MCNC: 100 MG/DL
RBC # BLD AUTO: 4.14 MILL/UL (ref 4.2–5.4)
RBC UR QL AUTO: (no result) HPF (ref 0–3)
SODIUM SERPL-SCNC: 137 MMOL/L (ref 136–145)
SP GR UR STRIP: 1.01 (ref 1–1.03)
WBC # BLD AUTO: 21.1 THOU/UL (ref 4.8–10.8)

## 2018-12-09 PROCEDURE — 87804 INFLUENZA ASSAY W/OPTIC: CPT

## 2018-12-09 PROCEDURE — 87040 BLOOD CULTURE FOR BACTERIA: CPT

## 2018-12-09 PROCEDURE — 81003 URINALYSIS AUTO W/O SCOPE: CPT

## 2018-12-09 PROCEDURE — 83735 ASSAY OF MAGNESIUM: CPT

## 2018-12-09 PROCEDURE — 81015 MICROSCOPIC EXAM OF URINE: CPT

## 2018-12-09 PROCEDURE — 84145 PROCALCITONIN (PCT): CPT

## 2018-12-09 PROCEDURE — 96365 THER/PROPH/DIAG IV INF INIT: CPT

## 2018-12-09 PROCEDURE — 84100 ASSAY OF PHOSPHORUS: CPT

## 2018-12-09 PROCEDURE — 93005 ELECTROCARDIOGRAM TRACING: CPT

## 2018-12-09 PROCEDURE — 83605 ASSAY OF LACTIC ACID: CPT

## 2018-12-09 PROCEDURE — 80053 COMPREHEN METABOLIC PANEL: CPT

## 2018-12-09 PROCEDURE — 87086 URINE CULTURE/COLONY COUNT: CPT

## 2018-12-09 PROCEDURE — 96367 TX/PROPH/DG ADDL SEQ IV INF: CPT

## 2018-12-09 PROCEDURE — 71046 X-RAY EXAM CHEST 2 VIEWS: CPT

## 2018-12-09 PROCEDURE — 80048 BASIC METABOLIC PNL TOTAL CA: CPT

## 2018-12-09 PROCEDURE — 85025 COMPLETE CBC W/AUTO DIFF WBC: CPT

## 2018-12-09 PROCEDURE — 83690 ASSAY OF LIPASE: CPT

## 2018-12-09 PROCEDURE — 36415 COLL VENOUS BLD VENIPUNCTURE: CPT

## 2018-12-09 RX ADMIN — Medication SCH ML: at 22:53

## 2018-12-09 NOTE — PDOC.FPRHP
- History of Present Illness


Chief Complaint: UTI, fever


History of Present Illness: 





Ms. Lange is 89 yo F with PMH HTN, bladder prolapse, recurrent UTIs presents 

with CC of UTI and fever. She has a long history of recurrent UTIs, managed by 

urologist, Dr. Umanzor. Has previously been on suppressive daily Keflex, 

though she said that didn't work and she no longer on it. Reports cloudy urine 

started yesterday. Today she started having fever. Denies dysuria, SOB, CP. 

This is typical in presentation to previous UTIs. Notes she has felt bad overall

, not been as active. Additionally notes non-productive cough for 2-3 days and 

some mild upper back pain. Currently feeling better.





Diagnosed with UTI 11/30, started on cipro, culture reportedly resistant to this

, then Friday prescribed macrobid by urology. Patient had been taken macrobid 

and cipro since then.





Notes history of PCN allergy, test dose of rocephin given in ED which was 

tolerated, then given full dose. 








ED Course: 





rocephin, azithromycin, tylenol, 40meq K+





- Allergies/Adverse Reactions


 Allergies











Allergy/AdvReac Type Severity Reaction Status Date / Time


 


Penicillins Allergy   Verified 12/09/18 22:01


 


Sulfa (Sulfonamide Allergy   Verified 12/09/18 22:01





Antibiotics)     














- Home Medications


 











 Medication  Instructions  Recorded  Confirmed  Type


 


Aspirin [Aspirin Chewable Tablet] 81 mg PO DAILY 07/11/18 12/09/18 History


 


Esomeprazole Magnesium [NexIUM] 40 mg PO QAM-WM 07/11/18 12/09/18 History


 


Rosuvastatin Calcium 10 mg PO DAILY 07/11/18 12/09/18 History


 


Sertraline HCl 50 mg PO DAILY 07/11/18 12/09/18 History


 


Polyethylene Glycol 3350 [Miralax] 17 gm PO DAILYPRN PRN  pk 07/12/18 12/09/18 

Rx


 


Amlodipine [Norvasc] 2.5 mg PO HS 10/26/18 12/09/18 History


 


Metoprolol Succinate [Toprol XL] 25 mg PO BID 10/26/18 12/09/18 History


 


Acetaminophen [Tylenol] 650 mg PO HS 12/09/18 12/09/18 History


 


Estradiol [Estrace 0.01% Vaginal 1 gm VAG SEEPHYS 12/09/18 12/09/18 History





Cream]    


 


diphenhydrAMINE HCl [Benadryl 25 mg PO HS 12/09/18 12/09/18 History





Allergy]    














- History


PMHx: HTN, HLD, bladder prolapse, arthritis, hx of breast cancer, depression


 


PSHx: hysterectomy, ovarian tumor removed, L breast lumpectomy for cancer, b/l 

knee surgery





FHx: non contributory


 


Social: Lives alone in independent living senior apartment community. Active 

and plays bingo. No tobacco, alcohol, or drug use.


 








- Review of Systems


General: reports: fever/chills, fatigue


ENT: denies: nasal congestion, rhinorrhea


Respiratory: reports: cough.  denies: congestion, shortness of breath


Cardiovascular: denies: chest pain, edema


Gastrointestinal: denies: nausea, vomiting, diarrhea, abdominal pain


Genitourinary: denies: incontinence, dysuria, discharge


Skin: denies: rashes, lesions


Musculoskeletal: reports: pain (upper back), arthritis/arthralgias (not painful)


Neurological: denies: syncope, seizure





- Vital signs


BP: 146/59  HR: 72 RR: 23 Tmax: 101.0 rectal Pox: 100% on RA 








- Physical Exam


Constitutional: NAD, awake, alert and oriented, well developed


HEENT: normocephalic and atraumatic, grossly normal vision, grossly normal 

hearing


Heart: RRR, normal S1/S2, no murmurs/rubs/gallops


Lungs: no respiratory distress, other (crackles at both lung bases)


Abdomen: soft, non-tender, bowel sounds present


Musculoskeletal: normal structure, normal tone


Neurological: no focal deficit


Skin: no rash/lesions, good turgor, capillary refill <2 seconds


Psychiatric: normal mood and affect





FMR H&P: Results





- Labs


Result Diagrams: 


 12/10/18 04:37





 12/10/18 04:37


Lab results: 


 











WBC  21.1 thou/uL (4.8-10.8)  H  12/09/18  19:07    


 


Hgb  11.9 g/dL (12.0-16.0)  L  12/09/18  19:07    


 


Hct  34.1 % (36.0-47.0)  L  12/09/18  19:07    


 


MCV  82.3 fL (78.0-98.0)   12/09/18  19:07    


 


Plt Count  266 thou/uL (130-400)   12/09/18  19:07    


 


Band Neuts % (Manual)  2 % (5-11)  L  12/09/18  19:07    


 


Sodium  137 mmol/L (136-145)   12/09/18  19:07    


 


Potassium  2.8 mmol/L (3.5-5.1)  L*  12/09/18  19:07    


 


Chloride  102 mmol/L ()   12/09/18  19:07    


 


Carbon Dioxide  24 mmol/L (23-31)   12/09/18  19:07    


 


BUN  18 mg/dL (9.8-20.1)   12/09/18  19:07    


 


Creatinine  0.74 mg/dL (0.6-1.1)   12/09/18  19:07    


 


Glucose  135 mg/dL ()  H  12/09/18  19:07    


 


Lactic Acid  1.2 mmol/L (0.5-2.2)   12/09/18  19:07    


 


Calcium  9.3 mg/dL (7.8-10.44)   12/09/18  19:07    


 


Total Bilirubin  1.5 mg/dL (0.2-1.2)  H  12/09/18  19:07    


 


AST  18 U/L (5-34)   12/09/18  19:07    


 


ALT  15 U/L (8-55)   12/09/18  19:07    


 


Alkaline Phosphatase  60 U/L ()   12/09/18  19:07    


 


Serum Total Protein  7.4 g/dL (6.0-8.3)   12/09/18  19:07    


 


Albumin  4.0 g/dL (3.4-4.8)   12/09/18  19:07    


 


Lipase  Less than  4 U/L (8-78)  L  12/09/18  19:07    


 


Urine Ketones  Trace mg/dL (Negative)  H  12/09/18  19:30    


 


Urine Blood  Moderate  (Negative)  H  12/09/18  19:30    


 


Urine Nitrite  Negative  (Negative)   12/09/18  19:30    


 


Ur Leukocyte Esterase  Small  (Negative)  H  12/09/18  19:30    


 


Urine RBC  21-50 HPF (0-3)  H  12/09/18  19:30    


 


Urine WBC  11-20 HPF (0-3)  H  12/09/18  19:30    


 


Ur Squamous Epith Cells  7-10 HPF (0-3)  H  12/09/18  19:30    


 


Urine Bacteria  2+ HPF (None Seen)  H  12/09/18  19:30    














FMR H&P: A/P





- Problem List


(1) Sepsis


Current Visit: Yes   Status: Acute   Code(s): A41.9 - SEPSIS, UNSPECIFIED 

ORGANISM   





(2) UTI (urinary tract infection)


Current Visit: Yes   Status: Acute   





(3) Atelectasis


Current Visit: Yes   Status: Acute   





(4) Hypokalemia


Current Visit: Yes   Status: Acute   Code(s): E87.6 - HYPOKALEMIA   





(5) HLD (hyperlipidemia)


Current Visit: No   Status: Chronic   Code(s): E78.5 - HYPERLIPIDEMIA, 

UNSPECIFIED   


Qualifiers: 


   Hyperlipidemia type: unspecified   Qualified Code(s): E78.5 - Hyperlipidemia

, unspecified   





(6) HTN (hypertension)


Current Visit: No   Status: Chronic   Code(s): I10 - ESSENTIAL (PRIMARY) 

HYPERTENSION   


Qualifiers: 


   Hypertension type: essential hypertension   Qualified Code(s): I10 - 

Essential (primary) hypertension   





(7) Leukocytosis


Current Visit: Yes   Status: Acute   Code(s): D72.829 - ELEVATED WHITE BLOOD 

CELL COUNT, UNSPECIFIED   


Qualifiers: 


   Leukocytosis type: unspecified   Qualified Code(s): D72.829 - Elevated white 

blood cell count, unspecified   





(8) MDD (major depressive disorder)


Current Visit: No   Status: Chronic   Code(s): F32.9 - MAJOR DEPRESSIVE DISORDER

, SINGLE EPISODE, UNSPECIFIED   





- Plan





89 yo F with PMH HTN, bladder prolapse, recurrent UTI is admitted for UTI, 

meeting sepsis criteria.





Sepsis 2/2 recurrent UTI


- Leukocytosis-WBC 21, fever 101 rectal, UA positive


- failed outpatient treatment with cipro and macrobid (2 days)


- previous cultures have growth E coli resistant to bactrim and pseudomonas 

mendecina. 


- sees Dr. Umanzor, Urology outpatient


- BP stable, no tachycardia, appears euvolemic, tolerating PO intake. Will give 

gentle fluids, LR @ 75.


- strict I/Os


- Continue Rocephin and levaquin (12/9)


- pending procalcitonin, Ucx, Bcx





Atelectasis


- initial concern for possible pneumonia with hx 2 day non-productive cough. 

Given one dose azithromycin from ED, will not continue at this time.


- CXR showed atelectasis, LLL base


- Infection source most likely UTI with atelectasis from pt endorsed decreased 

activity. 


- PT/OT, incentive spirometry. Will continue to monitor respiratory status and 

clinical improvement





Hypokalemia


- 2.8, received 40 meq replacement


- will recheck BMP this evening and replace as needed





HTN


- continue home metoprolol, amlodipine, aspirin


- BP stable





HLD


- continue home crestor





Depression


- continue home zoloft





Diet: Regular


Code: DNR


Ppx: SCDs, home nexium


Dispo: admit inpatient for sepsis 2/2 UTI

















FMR H&P: Upper Level





- Pertinent history





89 y/o F w/ PMHx of recurrent UTI's being seen by urology presents as a direct 

admit from [facility] for UTI and continued fever after initial dx at the end of 

November. Pt was seen by Urology and dx w/ E. coli UTI and placed on macrobid 

for this. Went to the ER again on 11/30 and was transitioned to PO cipro and 

repeat cx drawn at this time which have grown out negative. Pt has hx of 

recurrent E. coli UTI's resistant to fluoroquinolones and Bactrim. Also w/ 

relatively recent psuedomonas UTI as well. Pt reports fevers started back again 

today up to 100.9 rectal in the ER. Also reports some upper back pain which was 

concerning for possible PNA by ER doctor w/ some increased atelectasis seen on 

CXR in the lower lobe. Denies any dysuria or abdominal pain. Denies any SOB/CP/

Chills. Reports her urine has been cloudy. Pt reports that she has not been 

very mobile recently. 





- Pertinent findings





CXR - Atelectasis vs scarring L-Lung base





WBC - 21





Neut - 85%





U/A - moderate blood, small LE, 2+ bacteria, 11-20 WBC, Nitrite negative





K - 2.8








Vitals per intern note





GEN: NAD, resting in bed comfortably


CARD: RRR, no murmur rubs or gallops


PULM: Rales bases b/l, no wheezes or rhonci noted


GI: BSx4, soft Non-TTP


EXT: swan neck deformity hands b/l





- Plan


Date/Time: 12/09/18 2113








I B. Adam Garcia MD, have evaluated this patient and agree with findings/plan 

as outlined by intern resident. Pertinent changes/additions are listed here.








89 y/o F w/:





1) Sepsis 2/2 UTI w/ Failed outpatient treatment


- Pt peristent cloudy urine and now fevers w/ elevated WBC and fever to 100.9 

degF meeting sepsis criteria


- Started on Rocephin and azithromycin at outside ED for concern for possible L-

LL PNA and based on prior UCx sensitivities


- Pt was on macrobid w/o improvement of her sxs and repeat UCx on 11/30 which 

was no growth to day. Pt reports fevers started this AM. This makes me wonder 

if patient has developed a new UTI after initial resolution of the first. As 

she has had hx of Psuedomonal UTI in the past, will add levaquin in addition to 

Rocephin to cover both for her MDR E. coli and prior pseudomonal sensitivities. 


- Repeat UCx and BCx obtained 


- Initial lactate 1.2


- Will give patient gentle IVF resuscitation as she is currently 

hemodynamically stable and with concerns for overloading her w/ excessive IVF 

resuscitation 


- Strict I/O's to monitor fluid status 





2) Possible L-LL PNA vs atelectasis 


- Patient with overall equal crackles on exam in her bases b/l


- I feel this is more likely 2/2 atelectasis rather than PNA. Will d/c 

azithromycin and transition to levaquin as noted above as she will need 

coverage for possible pseudomonal UTI as well 


- Check Pro-willy which will likely be positive in setting of infection for #1, 

but will be used as a baseline that we can trend to ensure we are moving in the 

right direction in treating her infection


- Will consult PT/OT and place on pulm toilet w/ IS to help resolve likely 

underlying atelectasis


- If there is still doubt about this and causes issues w/ discharge abx, it may 

prove beneficial to obtain a CT-chest to definitively rule this in or out 

during her hospital stay





3) Hypokalemia 


- S/p repletion w/ 40 mEq at outsde ER


- Will repeat BMP to monitor for resolution


- Daily BMP's to monitor and will replace as needed





All other chronic medical problems as per intern note.





Admit: Medical/Inpatient


PPx: Lovenox


Dispo: Likely > 2 midnights pending culture results 





Assessment and plan discussed w/ Dr. Brand who is in agreement.








Attending Addendum





- Attending Addendum


Date/Time: 12/09/18 2230





I personally evaluated the patient and discussed the management with Dr. Heath and Dr. Garcia


I agree with the History, Examination, Assessment and Plan documented above 

with any addition or exceptions noted below.





89 yo female with history of recurrent UTIs is transferred from outside ER for 

possible sepsis. 


Patient with history of cystocele and recurrent urinary tract infections 

presents initially to outside ER for evaluation of not feeling well, fever of 

100.9, and left-sided back pain. Patient recently treated for UTI with 

Macrobid. Past history of resistant organisms. Patient also reporting cough for 

the past 2 days. On CXR no significant concerns for pneumonia. Will admit 

patient to medical. No significant CVAT on exam but ER MD notes pain on left 

with his exam. Has been started on antibiotics. Will add levoquin for broader 

coverage, specifically for pseudomonas. Trend labs. Add pro willy to trend. 

Monitor closely. Notify urology in AM. 





Stefania

## 2018-12-09 NOTE — RAD
PA AND LATERAL CHEST X-RAY:

 

12/09/2018

 

HISTORY:

Cough for two to three days.  Weakness.

 

COMPARISON:

12/30/2017

 

FINDINGS:

Surgical clips again overly the left axillary region.  The left breast shadow is smaller in size comp
ared to the right, likely related to a prior lumpectomy.  The cardiac silhouette is borderline enlarg
ed.  The pulmonary vasculature is within normal limits.  There is mild symmetric biapical pleural and
 parenchymal scarring present.  Linear densities are present at the left lung base, which may be rela
davida to scarring and/or atelectasis.  The lungs otherwise appear clear.  There are degenerative change
s seen in the spine with right convex rotoscoliosis of the thoracolumbar spine.

 

IMPRESSION:

Atelectasis and/or scarring, left lung base, which has increased from prior study.  There is otherwis
e no acute cardiopulmonary process.

 

POS: [facility]

## 2018-12-10 LAB
ANION GAP SERPL CALC-SCNC: 11 MMOL/L (ref 10–20)
ANION GAP SERPL CALC-SCNC: 16 MMOL/L (ref 10–20)
BASOPHILS # BLD AUTO: 0 THOU/UL (ref 0–0.2)
BASOPHILS NFR BLD AUTO: 0.1 % (ref 0–1)
BUN SERPL-MCNC: 17 MG/DL (ref 9.8–20.1)
BUN SERPL-MCNC: 17 MG/DL (ref 9.8–20.1)
CALCIUM SERPL-MCNC: 8.8 MG/DL (ref 7.8–10.44)
CALCIUM SERPL-MCNC: 9.2 MG/DL (ref 7.8–10.44)
CHLORIDE SERPL-SCNC: 106 MMOL/L (ref 98–107)
CHLORIDE SERPL-SCNC: 109 MMOL/L (ref 98–107)
CO2 SERPL-SCNC: 21 MMOL/L (ref 23–31)
CO2 SERPL-SCNC: 23 MMOL/L (ref 23–31)
CREAT CL PREDICTED SERPL C-G-VRATE: 63 ML/MIN (ref 70–130)
CREAT CL PREDICTED SERPL C-G-VRATE: 68 ML/MIN (ref 70–130)
EOSINOPHIL # BLD AUTO: 0.2 THOU/UL (ref 0–0.7)
EOSINOPHIL NFR BLD AUTO: 1.8 % (ref 0–10)
GLUCOSE SERPL-MCNC: 130 MG/DL (ref 83–110)
GLUCOSE SERPL-MCNC: 139 MG/DL (ref 83–110)
HGB BLD-MCNC: 10.4 G/DL (ref 12–16)
LYMPHOCYTES # BLD: 1.5 THOU/UL (ref 1.2–3.4)
LYMPHOCYTES NFR BLD AUTO: 11.6 % (ref 21–51)
MAGNESIUM SERPL-MCNC: 1.9 MG/DL (ref 1.6–2.6)
MCH RBC QN AUTO: 29.8 PG (ref 27–31)
MCV RBC AUTO: 88.8 FL (ref 78–98)
MONOCYTES # BLD AUTO: 0.9 THOU/UL (ref 0.11–0.59)
MONOCYTES NFR BLD AUTO: 7 % (ref 0–10)
NEUTROPHILS # BLD AUTO: 10.1 THOU/UL (ref 1.4–6.5)
NEUTROPHILS NFR BLD AUTO: 79.5 % (ref 42–75)
PLATELET # BLD AUTO: 287 THOU/UL (ref 130–400)
POTASSIUM SERPL-SCNC: 3.2 MMOL/L (ref 3.5–5.1)
POTASSIUM SERPL-SCNC: 3.6 MMOL/L (ref 3.5–5.1)
RBC # BLD AUTO: 3.49 MILL/UL (ref 4.2–5.4)
SODIUM SERPL-SCNC: 139 MMOL/L (ref 136–145)
SODIUM SERPL-SCNC: 140 MMOL/L (ref 136–145)
WBC # BLD AUTO: 12.7 THOU/UL (ref 4.8–10.8)

## 2018-12-10 RX ADMIN — Medication SCH ML: at 08:17

## 2018-12-10 RX ADMIN — Medication SCH: at 21:00

## 2018-12-10 NOTE — PRG
DATE OF SERVICE:  12/10/2018



ADDENDUM:  Please add, this is an addendum to the note of Dr. Sebastien Henry. 



Ms. Lange is a very pleasant 88-year-old white female patient, who has history of

recurrent UTI.  She was admitted this time after meeting SIRS criteria.  Her only

symptom of urinary tract infection however was increased urinary frequency.  She

denied dysuria.  She denied suprapubic tenderness and she denied CVA tenderness.

She did, however, have a fever as well upon admission.  She is currently on

broad-spectrum antibiotics that she has had resistant organisms in the past.  In the

event, clinically she is improved and we will continue to monitor. 







Job ID:  [id number]

## 2018-12-10 NOTE — PDOC.FM
- Subjective


Subjective: 





Pt states she is doing well. She denies malaise, fever, SOB, chest pain. She 

denies dysuria this morning.





- Objective


MAR Reviewed: Yes


Vital Signs & Weight: 


 Vital Signs (12 hours)











  Temp Pulse Resp BP Pulse Ox


 


 12/10/18 03:54  97.6 F  62  16  153/67 H  98


 


 12/09/18 23:43  97.8 F  67  16  132/60  99


 


 12/09/18 22:30      99


 


 12/09/18 21:30  97.5 F L  88  18  139/63  99








 Weight











Weight                         74.843 kg














Result Diagrams: 


 12/10/18 04:37





 12/10/18 04:37





Phys Exam





- Physical Examination


Constitutional: NAD


dry mm


Neck: no JVD


Crackles on her left lung fields


Cardiovascular: RRR, no significant murmur, no rub


Gastrointestinal: soft, non-tender, no distention, positive bowel sounds


Musculoskeletal: no edema, pulses present


Neurological: moves all 4 limbs


Psychiatric: normal affect, A&O x 3


Skin: cap refill <2 seconds





Dx/Plan


(1) Sepsis


Code(s): A41.9 - SEPSIS, UNSPECIFIED ORGANISM   Status: Acute   





(2) UTI (urinary tract infection)


Status: Acute   





(3) Atelectasis


Status: Acute   





(4) Hypokalemia


Code(s): E87.6 - HYPOKALEMIA   Status: Acute   





(5) Leukocytosis


Code(s): D72.829 - ELEVATED WHITE BLOOD CELL COUNT, UNSPECIFIED   Status: Acute

   


Qualifiers: 


   Leukocytosis type: unspecified   Qualified Code(s): D72.829 - Elevated white 

blood cell count, unspecified   





(6) HLD (hyperlipidemia)


Code(s): E78.5 - HYPERLIPIDEMIA, UNSPECIFIED   Status: Chronic   


Qualifiers: 


   Hyperlipidemia type: unspecified   Qualified Code(s): E78.5 - Hyperlipidemia

, unspecified   





(7) HTN (hypertension)


Code(s): I10 - ESSENTIAL (PRIMARY) HYPERTENSION   Status: Chronic   


Qualifiers: 


   Hypertension type: essential hypertension   Qualified Code(s): I10 - 

Essential (primary) hypertension   





(8) MDD (major depressive disorder)


Code(s): F32.9 - MAJOR DEPRESSIVE DISORDER, SINGLE EPISODE, UNSPECIFIED   Status

: Chronic   





- Plan


Plan: 





This is an 89 yo female with a PMH of recurrent UTIs, HLD, HTN, MDD 





Sepsis 2/2 recurrent UTIs


-Sepsis is resolved


-Continue Rocephin and levaquin (12/9)


-Previous cultures have grown E coli resistance to bactrim and pseudomonas


-Sees Dr. Umanzor for outpt. urology, will follow up outpt


-Failed outpt on cipro and macrobid


-IVF and encouraged PO fluid intake





Atelectasis


-CXR shows atelectasis, LLL base


-Procal 1.58, we will trend as needed


-PT/OT, IS, to assist in opening lungs


-Denies aspiration or dysphagia





Hypokalemia


-Improving, continue replacing





HTN


-Continue home metoprolol, amlodipine





HLD


-Continue home crestor





MDD


-Continue home zoloft





GERD


-Home nexium

## 2018-12-11 VITALS — TEMPERATURE: 97.8 F

## 2018-12-11 VITALS — DIASTOLIC BLOOD PRESSURE: 82 MMHG | SYSTOLIC BLOOD PRESSURE: 210 MMHG

## 2018-12-11 LAB
ANION GAP SERPL CALC-SCNC: 12 MMOL/L (ref 10–20)
BASOPHILS # BLD AUTO: 0 THOU/UL (ref 0–0.2)
BASOPHILS NFR BLD AUTO: 0.3 % (ref 0–1)
BUN SERPL-MCNC: 13 MG/DL (ref 9.8–20.1)
CALCIUM SERPL-MCNC: 9.1 MG/DL (ref 7.8–10.44)
CHLORIDE SERPL-SCNC: 109 MMOL/L (ref 98–107)
CO2 SERPL-SCNC: 24 MMOL/L (ref 23–31)
CREAT CL PREDICTED SERPL C-G-VRATE: 77 ML/MIN (ref 70–130)
EOSINOPHIL # BLD AUTO: 0.3 THOU/UL (ref 0–0.7)
EOSINOPHIL NFR BLD AUTO: 2.6 % (ref 0–10)
GLUCOSE SERPL-MCNC: 108 MG/DL (ref 83–110)
HGB BLD-MCNC: 10.7 G/DL (ref 12–16)
LYMPHOCYTES # BLD: 1.5 THOU/UL (ref 1.2–3.4)
LYMPHOCYTES NFR BLD AUTO: 14.5 % (ref 21–51)
MCH RBC QN AUTO: 31 PG (ref 27–31)
MCV RBC AUTO: 88.9 FL (ref 78–98)
MONOCYTES # BLD AUTO: 0.9 THOU/UL (ref 0.11–0.59)
MONOCYTES NFR BLD AUTO: 9.3 % (ref 0–10)
NEUTROPHILS # BLD AUTO: 7.3 THOU/UL (ref 1.4–6.5)
NEUTROPHILS NFR BLD AUTO: 73.3 % (ref 42–75)
PLATELET # BLD AUTO: 294 THOU/UL (ref 130–400)
POTASSIUM SERPL-SCNC: 3.6 MMOL/L (ref 3.5–5.1)
RBC # BLD AUTO: 3.46 MILL/UL (ref 4.2–5.4)
SODIUM SERPL-SCNC: 141 MMOL/L (ref 136–145)
WBC # BLD AUTO: 10 THOU/UL (ref 4.8–10.8)

## 2018-12-11 RX ADMIN — Medication SCH ML: at 08:57

## 2018-12-11 NOTE — PDOC.FM
- Subjective


Subjective: 





Pt state she is doing well today. She denies chest pain, SOB, headaches, or 

abdominal pain. She continues to deny dysuria.





- Objective


MAR Reviewed: Yes


Vital Signs & Weight: 


 Vital Signs (12 hours)











  Temp Pulse Resp BP BP Pulse Ox


 


 12/11/18 02:00  97.8 F  75  18   170/75 H 


 


 12/10/18 20:14   83   148/65 H  


 


 12/10/18 20:00       95


 


 12/10/18 18:59  98.2 F  87  16   148/65 H  92 L








 Weight











Weight                         74.843 kg














I&O: 


 











 12/09/18 12/10/18 12/11/18





 06:59 06:59 06:59


 


Intake Total   1551


 


Balance   1551











Result Diagrams: 


 12/11/18 03:52





 12/11/18 03:52





Phys Exam





- Physical Examination


Constitutional: NAD


HEENT: moist MMs


Neck: no JVD, supple


Pt has bilateral crackles at lung bases, good air movement


Cardiovascular: RRR, no significant murmur


Gastrointestinal: soft, non-tender, no distention, positive bowel sounds


Musculoskeletal: no edema, pulses present


Neurological: moves all 4 limbs


Psychiatric: normal affect, A&O x 3


Skin: cap refill <2 seconds





Dx/Plan


(1) Sepsis


Code(s): A41.9 - SEPSIS, UNSPECIFIED ORGANISM   Status: Acute   





(2) UTI (urinary tract infection)


Status: Acute   





(3) Atelectasis


Status: Acute   





(4) Hypokalemia


Code(s): E87.6 - HYPOKALEMIA   Status: Acute   





(5) Leukocytosis


Code(s): D72.829 - ELEVATED WHITE BLOOD CELL COUNT, UNSPECIFIED   Status: Acute

   


Qualifiers: 


   Leukocytosis type: unspecified   Qualified Code(s): D72.829 - Elevated white 

blood cell count, unspecified   





(6) HLD (hyperlipidemia)


Code(s): E78.5 - HYPERLIPIDEMIA, UNSPECIFIED   Status: Chronic   


Qualifiers: 


   Hyperlipidemia type: unspecified   Qualified Code(s): E78.5 - Hyperlipidemia

, unspecified   





(7) HTN (hypertension)


Code(s): I10 - ESSENTIAL (PRIMARY) HYPERTENSION   Status: Chronic   


Qualifiers: 


   Hypertension type: essential hypertension   Qualified Code(s): I10 - 

Essential (primary) hypertension   





(8) MDD (major depressive disorder)


Code(s): F32.9 - MAJOR DEPRESSIVE DISORDER, SINGLE EPISODE, UNSPECIFIED   Status

: Chronic   





- Plan


Plan: 





This is an 89 yo female with a PMH of recurrent UTIs, HLD, HTN, MDD 





Sepsis 2/2 recurrent UTIs


-Sepsis is resolved


-Continue Rocephin and levaquin (12/9) plan to change to PO meds upon urine 

cultures


-Previous cultures have grown E coli resistance to bactrim and pseudomonas


-Pending blood and urine cultures


-Sees Dr. Umanzor for outpt. urology, will follow up outpt


-Failed outpt on cipro and macrobid


-IVF and encouraged PO fluid intake





Atelectasis


-CXR shows atelectasis, LLL base


-Procal 1.58, we will trend as needed


-PT/OT, IS, to assist in opening lungs


-Denies aspiration or dysphagia





Hypokalemia


-Improving, continue replacing





HTN


-Continue home metoprolol, amlodipine





HLD


-Continue home crestor





MDD


-Continue home zoloft





GERD


-Home nexium

## 2018-12-11 NOTE — PRG
DATE OF SERVICE:  12/11/2018



ADDENDUM:  This is an addendum to the note of Dr. Sebastien Henry. 



Ms. Lange remains clinically quite improved.  She is awake, alert, in no distress.

We are still awaiting final results on her urine culture.  We will discharge her on

Levaquin and Cefdinir, pending results of urine culture as this will cover her last

two identified organisms from her last UTI.  Her blood pressure is still slightly

elevated, and we will continue to adjust her medications as an outpatient and

encourage her to see her PCP for followup. 







Job ID:  [id number]

## 2018-12-12 NOTE — DIS
DATE OF ADMISSION:  12/09/2018



DATE OF DISCHARGE:  12/11/2018



ADMITTING ATTENDING:  Kindra Medina MD



DISCHARGE ATTENDING:  Zia Cruz MD



CONSULTS:  None.



PROCEDURES:  Two-view chest x-ray showing atelectasis or scarring of the left lung

bases, which increased from prior study. Otherwise, no acute processes. 



PRIMARY DIAGNOSIS:  Sepsis secondary to urinary tract infection with fever and white

count. 



SECONDARY DIAGNOSES:  Atelectasis, hypokalemia, hypertension, hyperlipidemia,

depression. 



DISCHARGE MEDICATIONS:  

1. Cefdinir 300 mg p.o. q.12 hours for 4 days.

2. Amlodipine 5 mg p.o. daily.

3. Potassium chloride 20 mEq b.i.d.

4. Tylenol 650 mg p.o. at bedtime.

5. Aspirin 81 mg p.o. daily.

6. Benadryl 25 mg p.o. at bedtime.

7. Nexium 40 mg p.o. daily.

8. Estradiol vaginal cream.

9. Metoprolol 25 mg p.o. b.i.d.

10. MiraLax 17 g p.o. daily p.r.n.

11. Rosuvastatin 10 mg  p.o. daily.

12. Sertraline 50 mg p.o. daily.



DISCONTINUED MEDICATIONS:  Amlodipine 2.5 mg.



BRIEF HISTORY OF PRESENT ILLNESS/HOSPITAL COURSE:  This is an 88-year-old female

with past medical history of hypertension, bladder prolapse, and recurrent UTIs, who

presented after failed in outpatient treatment for UTI with Macrobid and Cipro. The

patient met SIRS criteria on admission with infectious source. The patient was

admitted and the patient was began on Rocephin and Levaquin to cover previous urine

cultures. Blood cultures and urine cultures were obtained at this time. At the time

of discharge, both cultures were negative. The patient's outpatient urologist is Dr. Umanzor. During the patient' stay, the patient's blood pressure remained

elevated and this was likely due to hypertension as well as the patient's anxiety.

During her stay, we increased her amlodipine from 2.5 mg to 5 mg  and found various

elevated blood pressures during that time. The patient's clinical status improved.

The patient remained afebrile at the time of discharge. Family and the patient were

concerned about elevated blood pressure, however, the patient was instructed to

follow up with primary care physician  and establish an appointment for tomorrow on

12/12/2018. As stated above, urine cultures did not grow any bacteria. The patient

was therefore sent home on cefdinir only for 4 additional days after receiving 3

days of IV antibiotics covering both pseudomonas and E coli. 



DISPOSITION:  Stable.



DISCHARGE INSTRUCTIONS:  

1. Location: Home.

2. Diet: Regular.

3. Activity: As tolerated.

4. Followup: Follow up with  __________ office tomorrow.







Job ID:  [id number]

## 2019-08-28 ENCOUNTER — HOSPITAL ENCOUNTER (OUTPATIENT)
Dept: HOSPITAL 92 - SCSULT | Age: 84
Discharge: HOME | End: 2019-08-28
Attending: UROLOGY
Payer: MEDICARE

## 2019-08-28 DIAGNOSIS — N28.1: Primary | ICD-10-CM

## 2019-08-28 DIAGNOSIS — Z87.440: ICD-10-CM

## 2019-08-28 DIAGNOSIS — R39.198: ICD-10-CM

## 2019-08-28 PROCEDURE — 76770 US EXAM ABDO BACK WALL COMP: CPT

## 2019-08-28 NOTE — ULT
RENAL SONOGRAM:

 

HISTORY: 

Renal cysts.

 

FINDINGS: 

The right kidney measures up to 9.2 cm.  No hydronephrosis.  Exophytic cyst along the lateral cortex 
measures up to 5.1 x 5.0 cm greatest diameter.

 

The left kidney is 11.7 cm.  No hydronephrosis.

 

The urinary bladder shows no focal abnormalities.  Postvoid volume calculated at 95 cc.

 

IMPRESSION: 

1.  Right renal cyst, 5.1 cm greatest diameter.

 

2.  No evidence of urinary tract obstruction.

 

3.  Small to moderate postvoid urinary bladder residual.

 

POS: [facility]

## 2019-12-02 ENCOUNTER — HOSPITAL ENCOUNTER (OUTPATIENT)
Dept: HOSPITAL 92 - SCSCT | Age: 84
Discharge: HOME | End: 2019-12-02
Payer: MEDICARE

## 2019-12-02 DIAGNOSIS — R31.29: Primary | ICD-10-CM

## 2019-12-02 DIAGNOSIS — N28.1: ICD-10-CM

## 2019-12-02 DIAGNOSIS — I70.90: ICD-10-CM

## 2019-12-02 DIAGNOSIS — N32.89: ICD-10-CM

## 2019-12-02 DIAGNOSIS — K76.89: ICD-10-CM

## 2019-12-02 DIAGNOSIS — K57.90: ICD-10-CM

## 2019-12-02 LAB — ESTIMATED GFR-MDRD - POC: 59

## 2019-12-02 PROCEDURE — 74178 CT ABD&PLV WO CNTR FLWD CNTR: CPT

## 2019-12-02 PROCEDURE — 82565 ASSAY OF CREATININE: CPT

## 2019-12-02 NOTE — CT
CT abdomen and pelvis without and with IV contrast



HISTORY: Hematuria.



COMPARISON: 10/27/2014.



FINDINGS: Each renal collecting system, ureter, and urinary bladder are decompressed without stone ev
ident. No filling defects are apparent within the opacified renal collecting systems and proximal

ureters on the delayed images. Contrast had not reached the urinary bladder. No enhancing masses or f
illing defects are apparent. Urinary bladder wall is thickened. Small amount of gas within the

nondependent portion of the urinary bladder.



Exophytic cyst along the lateral cortex of the right kidney is stable at 5.3 cm. Additional smaller r
enal and hepatic cysts appear stable.



Tiny nonspecific calcified and noncalcified granulomata at the lung bases. Prominent calcification th
roughout the arterial structures. Prominent degenerative changes of the thoracolumbar spine.

Diverticula arise from the colon without adjacent inflammation.













IMPRESSION: No CT evidence of urinary tract, obstruction, or aggressive mass.



Thickening of the urinary bladder wall with small amount of intraluminal gas. Has there been recent i
nstrumentation? Consider acute cystitis.



Hepatic and renal cysts.



Atherosclerosis.



Diverticulosis. No evidence of diverticulitis.



Reported By: BHARTI Reeder 

Electronically Signed:  12/2/2019 12:21 PM